# Patient Record
Sex: FEMALE | Race: WHITE | NOT HISPANIC OR LATINO
[De-identification: names, ages, dates, MRNs, and addresses within clinical notes are randomized per-mention and may not be internally consistent; named-entity substitution may affect disease eponyms.]

---

## 2017-02-22 ENCOUNTER — APPOINTMENT (OUTPATIENT)
Dept: CT IMAGING | Facility: CLINIC | Age: 77
End: 2017-02-22

## 2017-02-22 ENCOUNTER — OUTPATIENT (OUTPATIENT)
Dept: OUTPATIENT SERVICES | Facility: HOSPITAL | Age: 77
LOS: 1 days | End: 2017-02-22
Payer: MEDICARE

## 2017-02-22 DIAGNOSIS — R91.8 OTHER NONSPECIFIC ABNORMAL FINDING OF LUNG FIELD: Chronic | ICD-10-CM

## 2017-02-22 DIAGNOSIS — Z00.8 ENCOUNTER FOR OTHER GENERAL EXAMINATION: ICD-10-CM

## 2017-02-22 DIAGNOSIS — K46.9 UNSPECIFIED ABDOMINAL HERNIA WITHOUT OBSTRUCTION OR GANGRENE: Chronic | ICD-10-CM

## 2017-02-22 PROCEDURE — 71250 CT THORAX DX C-: CPT | Mod: 26

## 2017-02-22 PROCEDURE — 71250 CT THORAX DX C-: CPT

## 2017-05-11 ENCOUNTER — APPOINTMENT (OUTPATIENT)
Dept: BARIATRICS/WEIGHT MGMT | Facility: CLINIC | Age: 77
End: 2017-05-11

## 2017-05-23 ENCOUNTER — APPOINTMENT (OUTPATIENT)
Dept: BARIATRICS/WEIGHT MGMT | Facility: CLINIC | Age: 77
End: 2017-05-23

## 2017-06-14 ENCOUNTER — APPOINTMENT (OUTPATIENT)
Dept: BARIATRICS/WEIGHT MGMT | Facility: CLINIC | Age: 77
End: 2017-06-14

## 2017-11-17 ENCOUNTER — APPOINTMENT (OUTPATIENT)
Dept: CT IMAGING | Facility: CLINIC | Age: 77
End: 2017-11-17
Payer: MEDICARE

## 2017-11-17 ENCOUNTER — OUTPATIENT (OUTPATIENT)
Dept: OUTPATIENT SERVICES | Facility: HOSPITAL | Age: 77
LOS: 1 days | End: 2017-11-17
Payer: MEDICARE

## 2017-11-17 DIAGNOSIS — R91.8 OTHER NONSPECIFIC ABNORMAL FINDING OF LUNG FIELD: Chronic | ICD-10-CM

## 2017-11-17 DIAGNOSIS — K46.9 UNSPECIFIED ABDOMINAL HERNIA WITHOUT OBSTRUCTION OR GANGRENE: Chronic | ICD-10-CM

## 2017-11-17 DIAGNOSIS — Z00.8 ENCOUNTER FOR OTHER GENERAL EXAMINATION: ICD-10-CM

## 2017-11-17 PROCEDURE — 71250 CT THORAX DX C-: CPT | Mod: 26

## 2017-11-17 PROCEDURE — 71250 CT THORAX DX C-: CPT

## 2018-01-10 ENCOUNTER — INPATIENT (INPATIENT)
Facility: HOSPITAL | Age: 78
LOS: 3 days | Discharge: HOME | DRG: 871 | End: 2018-01-14
Attending: INTERNAL MEDICINE | Admitting: STUDENT IN AN ORGANIZED HEALTH CARE EDUCATION/TRAINING PROGRAM
Payer: MEDICARE

## 2018-01-10 VITALS
OXYGEN SATURATION: 96 % | SYSTOLIC BLOOD PRESSURE: 162 MMHG | HEIGHT: 63 IN | WEIGHT: 240.08 LBS | RESPIRATION RATE: 18 BRPM | HEART RATE: 112 BPM | DIASTOLIC BLOOD PRESSURE: 80 MMHG | TEMPERATURE: 99 F

## 2018-01-10 DIAGNOSIS — C85.90 NON-HODGKIN LYMPHOMA, UNSPECIFIED, UNSPECIFIED SITE: ICD-10-CM

## 2018-01-10 DIAGNOSIS — A41.9 SEPSIS, UNSPECIFIED ORGANISM: ICD-10-CM

## 2018-01-10 DIAGNOSIS — I63.9 CEREBRAL INFARCTION, UNSPECIFIED: ICD-10-CM

## 2018-01-10 DIAGNOSIS — E78.5 HYPERLIPIDEMIA, UNSPECIFIED: ICD-10-CM

## 2018-01-10 DIAGNOSIS — D86.0 SARCOIDOSIS OF LUNG: ICD-10-CM

## 2018-01-10 DIAGNOSIS — E03.9 HYPOTHYROIDISM, UNSPECIFIED: ICD-10-CM

## 2018-01-10 DIAGNOSIS — K46.9 UNSPECIFIED ABDOMINAL HERNIA WITHOUT OBSTRUCTION OR GANGRENE: Chronic | ICD-10-CM

## 2018-01-10 DIAGNOSIS — Z29.9 ENCOUNTER FOR PROPHYLACTIC MEASURES, UNSPECIFIED: ICD-10-CM

## 2018-01-10 DIAGNOSIS — R91.8 OTHER NONSPECIFIC ABNORMAL FINDING OF LUNG FIELD: Chronic | ICD-10-CM

## 2018-01-10 DIAGNOSIS — D72.829 ELEVATED WHITE BLOOD CELL COUNT, UNSPECIFIED: ICD-10-CM

## 2018-01-10 DIAGNOSIS — I10 ESSENTIAL (PRIMARY) HYPERTENSION: ICD-10-CM

## 2018-01-10 DIAGNOSIS — J18.9 PNEUMONIA, UNSPECIFIED ORGANISM: ICD-10-CM

## 2018-01-10 DIAGNOSIS — R05 COUGH: ICD-10-CM

## 2018-01-10 LAB
ALBUMIN SERPL ELPH-MCNC: 2.8 G/DL — LOW (ref 3.3–5)
ALP SERPL-CCNC: 159 U/L — HIGH (ref 40–120)
ALT FLD-CCNC: 44 U/L — SIGNIFICANT CHANGE UP (ref 12–78)
ANION GAP SERPL CALC-SCNC: 9 MMOL/L — SIGNIFICANT CHANGE UP (ref 5–17)
APTT BLD: 34.5 SEC — SIGNIFICANT CHANGE UP (ref 27.5–37.4)
AST SERPL-CCNC: 36 U/L — SIGNIFICANT CHANGE UP (ref 15–37)
BASOPHILS # BLD AUTO: 0.1 K/UL — SIGNIFICANT CHANGE UP (ref 0–0.2)
BASOPHILS NFR BLD AUTO: 0.4 % — SIGNIFICANT CHANGE UP (ref 0–2)
BILIRUB SERPL-MCNC: 0.6 MG/DL — SIGNIFICANT CHANGE UP (ref 0.2–1.2)
BUN SERPL-MCNC: 13 MG/DL — SIGNIFICANT CHANGE UP (ref 7–23)
CALCIUM SERPL-MCNC: 8.6 MG/DL — SIGNIFICANT CHANGE UP (ref 8.5–10.1)
CHLORIDE SERPL-SCNC: 105 MMOL/L — SIGNIFICANT CHANGE UP (ref 96–108)
CO2 SERPL-SCNC: 26 MMOL/L — SIGNIFICANT CHANGE UP (ref 22–31)
CREAT SERPL-MCNC: 0.69 MG/DL — SIGNIFICANT CHANGE UP (ref 0.5–1.3)
EOSINOPHIL # BLD AUTO: 0 K/UL — SIGNIFICANT CHANGE UP (ref 0–0.5)
EOSINOPHIL NFR BLD AUTO: 0.2 % — SIGNIFICANT CHANGE UP (ref 0–6)
GLUCOSE SERPL-MCNC: 107 MG/DL — HIGH (ref 70–99)
HCT VFR BLD CALC: 36 % — SIGNIFICANT CHANGE UP (ref 34.5–45)
HGB BLD-MCNC: 11.6 G/DL — SIGNIFICANT CHANGE UP (ref 11.5–15.5)
INR BLD: 1.22 RATIO — HIGH (ref 0.88–1.16)
LACTATE SERPL-SCNC: 0.6 MMOL/L — LOW (ref 0.7–2)
LYMPHOCYTES # BLD AUTO: 1.3 K/UL — SIGNIFICANT CHANGE UP (ref 1–3.3)
LYMPHOCYTES # BLD AUTO: 7.7 % — LOW (ref 13–44)
MCHC RBC-ENTMCNC: 27.3 PG — SIGNIFICANT CHANGE UP (ref 27–34)
MCHC RBC-ENTMCNC: 32.4 GM/DL — SIGNIFICANT CHANGE UP (ref 32–36)
MCV RBC AUTO: 84.2 FL — SIGNIFICANT CHANGE UP (ref 80–100)
MONOCYTES # BLD AUTO: 1.3 K/UL — HIGH (ref 0–0.9)
MONOCYTES NFR BLD AUTO: 8 % — SIGNIFICANT CHANGE UP (ref 1–9)
NEUTROPHILS # BLD AUTO: 14.1 K/UL — HIGH (ref 1.8–7.4)
NEUTROPHILS NFR BLD AUTO: 83.7 % — HIGH (ref 43–77)
PLATELET # BLD AUTO: 244 K/UL — SIGNIFICANT CHANGE UP (ref 150–400)
POTASSIUM SERPL-MCNC: 3.7 MMOL/L — SIGNIFICANT CHANGE UP (ref 3.5–5.3)
POTASSIUM SERPL-SCNC: 3.7 MMOL/L — SIGNIFICANT CHANGE UP (ref 3.5–5.3)
PROT SERPL-MCNC: 7.5 G/DL — SIGNIFICANT CHANGE UP (ref 6–8.3)
PROTHROM AB SERPL-ACNC: 13.4 SEC — HIGH (ref 9.8–12.7)
RAPID RVP RESULT: SIGNIFICANT CHANGE UP
RBC # BLD: 4.27 M/UL — SIGNIFICANT CHANGE UP (ref 3.8–5.2)
RBC # FLD: 13 % — SIGNIFICANT CHANGE UP (ref 10.3–14.5)
SODIUM SERPL-SCNC: 140 MMOL/L — SIGNIFICANT CHANGE UP (ref 135–145)
WBC # BLD: 16.9 K/UL — HIGH (ref 3.8–10.5)
WBC # FLD AUTO: 16.9 K/UL — HIGH (ref 3.8–10.5)

## 2018-01-10 PROCEDURE — 93010 ELECTROCARDIOGRAM REPORT: CPT

## 2018-01-10 PROCEDURE — 99285 EMERGENCY DEPT VISIT HI MDM: CPT

## 2018-01-10 PROCEDURE — 71250 CT THORAX DX C-: CPT | Mod: 26

## 2018-01-10 PROCEDURE — 99223 1ST HOSP IP/OBS HIGH 75: CPT | Mod: AI,GC

## 2018-01-10 PROCEDURE — 71046 X-RAY EXAM CHEST 2 VIEWS: CPT | Mod: 26

## 2018-01-10 RX ORDER — IPRATROPIUM/ALBUTEROL SULFATE 18-103MCG
3 AEROSOL WITH ADAPTER (GRAM) INHALATION EVERY 6 HOURS
Qty: 0 | Refills: 0 | Status: DISCONTINUED | OUTPATIENT
Start: 2018-01-10 | End: 2018-01-14

## 2018-01-10 RX ORDER — ENOXAPARIN SODIUM 100 MG/ML
40 INJECTION SUBCUTANEOUS
Qty: 0 | Refills: 0 | Status: DISCONTINUED | OUTPATIENT
Start: 2018-01-10 | End: 2018-01-14

## 2018-01-10 RX ORDER — AZITHROMYCIN 500 MG/1
500 TABLET, FILM COATED ORAL ONCE
Qty: 0 | Refills: 0 | Status: COMPLETED | OUTPATIENT
Start: 2018-01-10 | End: 2018-01-10

## 2018-01-10 RX ORDER — IPRATROPIUM/ALBUTEROL SULFATE 18-103MCG
3 AEROSOL WITH ADAPTER (GRAM) INHALATION ONCE
Qty: 0 | Refills: 0 | Status: COMPLETED | OUTPATIENT
Start: 2018-01-10 | End: 2018-01-10

## 2018-01-10 RX ORDER — ACETAMINOPHEN 500 MG
650 TABLET ORAL EVERY 6 HOURS
Qty: 0 | Refills: 0 | Status: DISCONTINUED | OUTPATIENT
Start: 2018-01-10 | End: 2018-01-14

## 2018-01-10 RX ORDER — BUDESONIDE AND FORMOTEROL FUMARATE DIHYDRATE 160; 4.5 UG/1; UG/1
2 AEROSOL RESPIRATORY (INHALATION)
Qty: 0 | Refills: 0 | Status: DISCONTINUED | OUTPATIENT
Start: 2018-01-10 | End: 2018-01-14

## 2018-01-10 RX ORDER — CEFTRIAXONE 500 MG/1
1 INJECTION, POWDER, FOR SOLUTION INTRAMUSCULAR; INTRAVENOUS EVERY 24 HOURS
Qty: 0 | Refills: 0 | Status: DISCONTINUED | OUTPATIENT
Start: 2018-01-11 | End: 2018-01-14

## 2018-01-10 RX ORDER — SIMVASTATIN 20 MG/1
40 TABLET, FILM COATED ORAL DAILY
Qty: 0 | Refills: 0 | Status: DISCONTINUED | OUTPATIENT
Start: 2018-01-10 | End: 2018-01-14

## 2018-01-10 RX ORDER — ASPIRIN/CALCIUM CARB/MAGNESIUM 324 MG
81 TABLET ORAL DAILY
Qty: 0 | Refills: 0 | Status: DISCONTINUED | OUTPATIENT
Start: 2018-01-10 | End: 2018-01-14

## 2018-01-10 RX ORDER — AZITHROMYCIN 500 MG/1
500 TABLET, FILM COATED ORAL EVERY 24 HOURS
Qty: 0 | Refills: 0 | Status: DISCONTINUED | OUTPATIENT
Start: 2018-01-10 | End: 2018-01-14

## 2018-01-10 RX ORDER — CEFTRIAXONE 500 MG/1
1 INJECTION, POWDER, FOR SOLUTION INTRAMUSCULAR; INTRAVENOUS ONCE
Qty: 0 | Refills: 0 | Status: COMPLETED | OUTPATIENT
Start: 2018-01-10 | End: 2018-01-10

## 2018-01-10 RX ORDER — CHOLECALCIFEROL (VITAMIN D3) 125 MCG
2000 CAPSULE ORAL DAILY
Qty: 0 | Refills: 0 | Status: DISCONTINUED | OUTPATIENT
Start: 2018-01-10 | End: 2018-01-14

## 2018-01-10 RX ORDER — LEVOTHYROXINE SODIUM 125 MCG
50 TABLET ORAL DAILY
Qty: 0 | Refills: 0 | Status: DISCONTINUED | OUTPATIENT
Start: 2018-01-10 | End: 2018-01-14

## 2018-01-10 RX ORDER — LISINOPRIL 2.5 MG/1
10 TABLET ORAL DAILY
Qty: 0 | Refills: 0 | Status: DISCONTINUED | OUTPATIENT
Start: 2018-01-10 | End: 2018-01-14

## 2018-01-10 RX ADMIN — CEFTRIAXONE 100 GRAM(S): 500 INJECTION, POWDER, FOR SOLUTION INTRAMUSCULAR; INTRAVENOUS at 13:41

## 2018-01-10 RX ADMIN — Medication 125 MILLIGRAM(S): at 12:00

## 2018-01-10 RX ADMIN — Medication 3 MILLILITER(S): at 12:00

## 2018-01-10 RX ADMIN — Medication 3 MILLILITER(S): at 11:30

## 2018-01-10 RX ADMIN — ENOXAPARIN SODIUM 40 MILLIGRAM(S): 100 INJECTION SUBCUTANEOUS at 19:40

## 2018-01-10 RX ADMIN — BUDESONIDE AND FORMOTEROL FUMARATE DIHYDRATE 2 PUFF(S): 160; 4.5 AEROSOL RESPIRATORY (INHALATION) at 22:25

## 2018-01-10 RX ADMIN — AZITHROMYCIN 255 MILLIGRAM(S): 500 TABLET, FILM COATED ORAL at 14:34

## 2018-01-10 NOTE — ED ADULT NURSE NOTE - OBJECTIVE STATEMENT
Pt received in bed alert and oriented and resting in bed with the c/o cough and SOB x 2 days. As per Md's orders IV cat placed blood specimen obtained and sent to the lab. Pt given Neb tx along with solumedrol IV. Nursing care ongoing and safety maintained.

## 2018-01-10 NOTE — H&P ADULT - PROBLEM SELECTOR PLAN 8
IMPROVE VTE Individual Risk Assessment        RISK                                                          Points  [  ] Previous VTE                                                3  [  ] Thrombophilia                                             2  [  ] Lower limb paralysis                                   2        (unable to hold up >15 seconds)    [  ] Current Cancer                                             2         (within 6 months)  [  ] Immobilization > 24 hrs                              1  [  ] ICU/CCU stay > 24 hours                             1  [ x ] Age > 60                                                         1  IMPROVE VTE Score: 1  DVT ppx: lovenox nutrition counseling

## 2018-01-10 NOTE — H&P ADULT - PROBLEM SELECTOR PLAN 1
Admit to [facility] - RLL Pna likely CAP, septic on presentation, leukocytosis, cxr reviewed, lactate not elevated, afebrile on presentation, will monitor for fevers   - c/w rocephin and zithro   - Tylenol prn for fever  - duonebs prn  - no ivf indicated, encourage PO fluids  - continuous pulse ox, keep 02 > 92%  - HOB elevated  - f/u blood cultures Admit to [facility] - RLL Pna likely CAP, septic on presentation, leukocytosis, cxr reviewed, lactate not elevated, afebrile on presentation, will monitor for fevers   - c/w rocephin and zithro, day #1  - Tylenol prn for fever  - duonebs prn  - no ivf indicated, encourage PO fluids  - continuous pulse ox, keep 02 > 92%  - HOB elevated  - f/u blood cultures

## 2018-01-10 NOTE — H&P ADULT - NSHPPHYSICALEXAM_GEN_ALL_CORE
Physical Exam:  General: Well developed, well nourished, NAD, obese  HEENT: NCAT, PERRLA, EOMI bl, moist mucous membranes   Neck: Supple, nontender, no mass  Neurology: A&Ox3, nonfocal, sensation intact, no gait abnormalities   Respiratory: decreased bs RLL field  CV: RRR, +S1/S2, no murmurs, rubs or gallops  Abdominal: Soft, NT, ND BS appreciated  Extremities: trace RLE edema, 2 + peripheral pulses  MSK: Normal ROM, no joint erythema or warmth, no joint swelling   Skin: warm, dry, normal color

## 2018-01-10 NOTE — ED PROVIDER NOTE - OBJECTIVE STATEMENT
pt c/o cough, sob, myalgias, wheezing. no fevers, chills, abd pain, d/n/v  pmd - elan  pul [place]

## 2018-01-10 NOTE — ED ADULT NURSE NOTE - PMH
Bronchitis  Hospitalized in June 2016  CVA (cerebral infarction)  2012  Dyslipidemia    Environmental allergies    Essential hypertension    Hypothyroid    Lung mass  July 2016  Morbid obesity    Non Hodgkin's lymphoma  s/p chemo, XRT 1997  Occipital neuralgia of right side    Sarcoidosis of lung    Snoring  KIKI precautions -- responds affirmatively to STOP BANG questionnaire -- admits to loud snoring; age > 50; h/o htn; bmi > 35  Stillborn, normal

## 2018-01-10 NOTE — ED ADULT NURSE NOTE - PSH
Abdominal hernia  March 2016  Carpal tunnel syndrome on both sides  s/p surgical repair  H/O lymph node biopsy  benign, right chest area  Lung mass  2013  right lung biopsy with diagnosis of sarcoidosis  S/P appendectomy    S/P hysterectomy

## 2018-01-10 NOTE — ED ADULT NURSE NOTE - ED STAT RN HANDOFF DETAILS 2
Pt stable and resting in bed. Pt denies any pain or discomfort as of this time. Pt admitted and handoff report given to SABAS Tejada for continuum of care. No sign of distress noted.

## 2018-01-10 NOTE — H&P ADULT - HISTORY OF PRESENT ILLNESS
76 F PMHx Sarcoidosis, CVA, Dyslipidemia, HTN, Hypothyroid, Non-Hodgkin's Lymphoma, Obesity presented to the ED for cough and weakness.  Pt reports symptoms started past Sunday night after she went out with family for dinner.  Reports progressive dry cough without production and increasing sob with weakness over this time.  Reports feeling warm last night with possible fever.  Symptoms progressive worsened, pt worried for pna and came to the ED.  Currently c/o cough, weakness, mild sob, loss of voice. Denies cp, dizziness, palpitation, changes in vision, abdominal pain, n/v/d/c, dysuria.    In the ED: vitals: tachy 112, afebrile, bp 162/80, o2 96% RA. Labs: wbc 16.9, alk phos 159, rvp neg. cxr: right basilar   infiltrate/atelectasis. EKG prelim read sinus tachy. Given zithro, rocephin, duonebs, solu-medrol 76 F PMHx Sarcoidosis, CVA, Dyslipidemia, HTN, Hypothyroid, Non-Hodgkin's Lymphoma, Obesity presented to the ED for cough and weakness.  Pt reports symptoms started past Sunday night after she went out with family for dinner.  Reports progressive dry cough without production and increasing sob with weakness over this time.  Reports feeling warm last night with possible fever.  Symptoms progressive worsened, pt worried for pna and came to the ED.  Associated w/ somewhat reduced appetite and feeling "off" Pt has h/o sarcoidosis and has chronic respiratory problems. She follows up w/ physicians regularly and is adherent to her medication regimen. No recent weight changes. No recent travel. No other obvious sick contacts. Currently c/o cough, weakness, mild sob, loss of voice. Denies cp, dizziness, palpitation, changes in vision, abdominal pain, n/v/d/c, dysuria.    In the ED: vitals: tachy 112, afebrile, bp 162/80, o2 96% RA. Labs: wbc 16.9, alk phos 159, rvp neg. cxr: right basilar   infiltrate/atelectasis. EKG prelim read sinus tachy. Given zithro, rocephin, duonebs, solu-medrol

## 2018-01-10 NOTE — H&P ADULT - ASSESSMENT
76 F PMHx Sarcoidosis, CVA, Dyslipidemia, HTN, Hypothyroid, Non-Hodgkin's Lymphoma, Obesity presented to the ED for cough and weakness. Admitted for PNA

## 2018-01-10 NOTE — ED ADULT NURSE REASSESSMENT NOTE - NS ED NURSE REASSESS COMMENT FT1
Received patient from Keo OBREGON. Patient alert and oriented. Vital signs as documented. Patient remains on bedside cardiac monitor. Denies pain at this time. Safety maintained. Call bell in reach.

## 2018-01-10 NOTE — H&P ADULT - NSHPSOCIALHISTORY_GEN_ALL_CORE
, lives at home alone, ambulates and performs ADLs independently. Denies etoh and drug abuse. Former smoke, 10 pack years

## 2018-01-10 NOTE — H&P ADULT - PROBLEM SELECTOR PLAN 9
IMPROVE VTE Individual Risk Assessment        RISK                                                          Points  [  ] Previous VTE                                                3  [  ] Thrombophilia                                             2  [  ] Lower limb paralysis                                   2        (unable to hold up >15 seconds)    [  ] Current Cancer                                             2         (within 6 months)  [  ] Immobilization > 24 hrs                              1  [  ] ICU/CCU stay > 24 hours                             1  [ x ] Age > 60                                                         1  IMPROVE VTE Score: 1  DVT ppx: lovenox

## 2018-01-10 NOTE — H&P ADULT - ATTENDING COMMENTS
I personally conducted a physical examination of the patient. I personally gathered the patient's history. I edited the above listed findings which were prepared by the listed resident physician. I personally discussed the plan of care with the patient. The questions and concerns were addressed to the best of my ability. The patient is in agreement with the listed treatment plan.     A/P: I reviewed and agree w/ the above documentation. I personally discussed the tx plan w/ pt and she agrees. Dr. Mclean was consulted by the ED.

## 2018-01-10 NOTE — H&P ADULT - NSHPREVIEWOFSYSTEMS_GEN_ALL_CORE
Constitutional: reports fever, chills, denies diaphoresis   HEENT: denies blurry vision, difficulty hearing  Respiratory: reports sob, lara, cough, denies sputum production, wheezing, hemoptysis  Cardiovascular: denies CP, palpitations, edema  Gastrointestinal: denies nausea, vomiting, diarrhea, constipation, abdominal pain, melena, hematochezia   Genitourinary: denies dysuria, frequency, urgency, hematuria   Skin/Breast: denies rash, itching  Musculoskeletal: denies myalgias, joint swelling, muscle weakness  Neurologic: denies headache, weakness, dizziness, paresthesias, numbness/tingling  Psychiatric: denies feeling anxious, depressed, suicidal, homicidal thoughts  Hematology/Oncology: denies bruising, tender or enlarged lymph nodes   ROS negative except as noted above

## 2018-01-10 NOTE — ED PROVIDER NOTE - CARE PLAN
Principal Discharge DX:	Cough  Secondary Diagnosis:	Wheezing Principal Discharge DX:	Cough  Secondary Diagnosis:	Wheezing  Secondary Diagnosis:	PNA (pneumonia)

## 2018-01-10 NOTE — CONSULT NOTE ADULT - SUBJECTIVE AND OBJECTIVE BOX
PULMONARY/CRITICAL CARE        Patient is a 78y old  Female who presents with a chief complaint of cough, weakness (10 Bernardino 2018 13:51), fever for few days. Some wheeze.    BRIEF HOSPITAL COURSE: ***    Events last 24 hours: ***    PAST MEDICAL & SURGICAL HISTORY:  Environmental allergies  Morbid obesity  Stillborn, normal  Snoring: KIKI precautions -- responds affirmatively to STOP BANG questionnaire -- admits to loud snoring; age &gt; 50; h/o htn; bmi &gt; 35  Bronchitis: Hospitalized in June 2016  Lung mass: July 2016  Occipital neuralgia of right side  Sarcoidosis of lung  Essential hypertension  CVA (cerebral infarction): 2012  Non Hodgkin's lymphoma: s/p chemo, XRT 1997  Hypothyroid  Dyslipidemia  Lung mass: 2013  right lung biopsy with diagnosis of sarcoidosis  Abdominal hernia: March 2016  H/O lymph node biopsy: benign, right chest area  S/P appendectomy  S/P hysterectomy  Carpal tunnel syndrome on both sides: s/p surgical repair    Allergies    No Known Allergies    Intolerances      FAMILY HISTORY:  Family history of MI (myocardial infarction) (Father)      Review of Systems:  CONSTITUTIONAL: had fever, chills, and fatigue  EYES: No eye pain, visual disturbances, or discharge  ENMT:  No difficulty hearing, tinnitus, vertigo; No sinus or throat pain  NECK: No pain or stiffness  RESPIRATORY: had cough, wheezing, chills  No hemoptysis; mild  shortness of breath  CARDIOVASCULAR: No chest pain, palpitations, dizziness, or leg swelling  GASTROINTESTINAL: No abdominal or epigastric pain. No nausea, vomiting, or hematemesis; No diarrhea or constipation. No melena or hematochezia.  GENITOURINARY: No dysuria, frequency, hematuria, or incontinence  NEUROLOGICAL: No headaches, memory loss, loss of strength, numbness, or tremors  SKIN: No itching, burning, rashes, or lesions   MUSCULOSKELETAL: No joint pain or swelling; No muscle, back, or extremity pain  PSYCHIATRIC: No depression, anxiety, mood swings, or difficulty sleeping      Medications:  azithromycin  IVPB 500 milliGRAM(s) IV Intermittent every 24 hours  cefTRIAXone   IVPB 1 Gram(s) IV Intermittent every 24 hours    lisinopril 10 milliGRAM(s) Oral daily    ALBUTerol/ipratropium for Nebulization 3 milliLiter(s) Nebulizer every 6 hours PRN  buDESOnide 160 MICROgram(s)/formoterol 4.5 MICROgram(s) Inhaler 2 Puff(s) Inhalation two times a day    acetaminophen   Tablet 650 milliGRAM(s) Oral every 6 hours PRN  acetaminophen   Tablet. 650 milliGRAM(s) Oral every 6 hours PRN      aspirin enteric coated 81 milliGRAM(s) Oral daily  enoxaparin Injectable 40 milliGRAM(s) SubCutaneous two times a day        levothyroxine 50 MICROGram(s) Oral daily  simvastatin 40 milliGRAM(s) Oral daily    cholecalciferol 2000 Unit(s) Oral daily                ICU Vital Signs Last 24 Hrs  T(C): 37.4 (10 Bernardino 2018 12:00), Max: 37.4 (10 Bernardino 2018 10:31)  T(F): 99.3 (10 Bernardino 2018 12:00), Max: 99.3 (10 Bernardino 2018 10:31)  HR: 100 (10 Bernardino 2018 12:00) (100 - 112)  BP: 158/89 (10 Bernardino 2018 12:00) (158/89 - 162/80)  BP(mean): --  ABP: --  ABP(mean): --  RR: 17 (10 Bernardino 2018 12:00) (17 - 18)  SpO2: 96% (10 Bernardino 2018 12:00) (96% - 96%)    Vital Signs Last 24 Hrs  T(C): 37.4 (10 Bernardino 2018 12:00), Max: 37.4 (10 Bernardino 2018 10:31)  T(F): 99.3 (10 Bernardino 2018 12:00), Max: 99.3 (10 Bernardino 2018 10:31)  HR: 100 (10 Bernardino 2018 12:00) (100 - 112)  BP: 158/89 (10 Bernardino 2018 12:00) (158/89 - 162/80)  BP(mean): --  RR: 17 (10 Bernardino 2018 12:00) (17 - 18)  SpO2: 96% (10 Bernardino 2018 12:00) (96% - 96%)        I&O's Detail        LABS:                        11.6   16.9  )-----------( 244      ( 10 Bernardino 2018 12:45 )             36.0     01-10    140  |  105  |  13  ----------------------------<  107<H>  3.7   |  26  |  0.69    Ca    8.6      10 Bernardino 2018 12:45    TPro  7.5  /  Alb  2.8<L>  /  TBili  0.6  /  DBili  x   /  AST  36  /  ALT  44  /  AlkPhos  159<H>  01-10          CAPILLARY BLOOD GLUCOSE        PT/INR - ( 10 Bernardino 2018 12:45 )   PT: 13.4 sec;   INR: 1.22 ratio         PTT - ( 10 Bernardino 2018 12:45 )  PTT:34.5 sec    CULTURES:      Physical Examination:    General: No acute distress.      HEENT: Pupils equal, reactive to light.  Symmetric.    PULM: crackles left base, good excursion    CVS: Regular rate and rhythm, no murmurs, rubs, or gallops    ABD: Soft, nondistended, nontender, normoactive bowel sounds, no masses    EXT: No edema, nontender    SKIN: Warm and well perfused, no rashes noted.    NEURO: Alert, oriented, interactive, nonfocal    RADIOLOGY: ***< from: CT Chest No Cont (01.10.18 @ 15:48) >  EXAM:  CT CHEST                            PROCEDURE DATE:  01/10/2018          INTERPRETATION:  CLINICAL INFORMATION:  Cough and fever chest x-ray   question bibasilar infiltrate versus atelectasis    PROCEDURE:  Using multislice helical CT, thin sections were obtained from   the thoracic inlet through the lung bases.    COMPARISON: 2/22/2017    FINDINGS:      There is no significant axillary, hilar, or mediastinal lymphadenopathy.   There is no pleural or pericardial effusion. There is atherosclerotic   calcification of the thoracic aorta and its branches. There are shotty   mediastinal lymph nodes again noted. There is coronary artery   calcification.    There is no evidence of mass or nodule. Azygos lobe is incidentally seen.   There are multiple patchy ill-defined densities bilaterally some of which   are pleural-based suspicious for inflammatory/infectious process    The osseous structures demonstrate degenerative changes.    The upper abdomen is remarkable for calcified gallstones in the   gallbladder.    IMPRESSION: Multiple patchy areas of ill-defined densities suspicious for   infectious/inflammatory process  Cholelithiasis      < end of copied text >      CRITICAL CARE TIME SPENT: ***

## 2018-01-11 DIAGNOSIS — J18.1 LOBAR PNEUMONIA, UNSPECIFIED ORGANISM: ICD-10-CM

## 2018-01-11 DIAGNOSIS — D86.9 SARCOIDOSIS, UNSPECIFIED: ICD-10-CM

## 2018-01-11 DIAGNOSIS — E66.01 MORBID (SEVERE) OBESITY DUE TO EXCESS CALORIES: ICD-10-CM

## 2018-01-11 LAB
ANION GAP SERPL CALC-SCNC: 13 MMOL/L — SIGNIFICANT CHANGE UP (ref 5–17)
BUN SERPL-MCNC: 23 MG/DL — SIGNIFICANT CHANGE UP (ref 7–23)
CALCIUM SERPL-MCNC: 9.1 MG/DL — SIGNIFICANT CHANGE UP (ref 8.5–10.1)
CHLORIDE SERPL-SCNC: 103 MMOL/L — SIGNIFICANT CHANGE UP (ref 96–108)
CO2 SERPL-SCNC: 23 MMOL/L — SIGNIFICANT CHANGE UP (ref 22–31)
CREAT SERPL-MCNC: 1 MG/DL — SIGNIFICANT CHANGE UP (ref 0.5–1.3)
GLUCOSE SERPL-MCNC: 137 MG/DL — HIGH (ref 70–99)
HCT VFR BLD CALC: 38.5 % — SIGNIFICANT CHANGE UP (ref 34.5–45)
HGB BLD-MCNC: 12.3 G/DL — SIGNIFICANT CHANGE UP (ref 11.5–15.5)
LEGIONELLA AG UR QL: NEGATIVE — SIGNIFICANT CHANGE UP
LYMPHOCYTES # BLD AUTO: 13 % — SIGNIFICANT CHANGE UP (ref 13–44)
MCHC RBC-ENTMCNC: 26.8 PG — LOW (ref 27–34)
MCHC RBC-ENTMCNC: 31.8 GM/DL — LOW (ref 32–36)
MCV RBC AUTO: 84.1 FL — SIGNIFICANT CHANGE UP (ref 80–100)
MONOCYTES NFR BLD AUTO: 8 % — SIGNIFICANT CHANGE UP (ref 1–9)
NEUTROPHILS NFR BLD AUTO: 71 % — SIGNIFICANT CHANGE UP (ref 43–77)
PLATELET # BLD AUTO: 323 K/UL — SIGNIFICANT CHANGE UP (ref 150–400)
POTASSIUM SERPL-MCNC: 3.8 MMOL/L — SIGNIFICANT CHANGE UP (ref 3.5–5.3)
POTASSIUM SERPL-SCNC: 3.8 MMOL/L — SIGNIFICANT CHANGE UP (ref 3.5–5.3)
RBC # BLD: 4.58 M/UL — SIGNIFICANT CHANGE UP (ref 3.8–5.2)
RBC # FLD: 13 % — SIGNIFICANT CHANGE UP (ref 10.3–14.5)
SODIUM SERPL-SCNC: 139 MMOL/L — SIGNIFICANT CHANGE UP (ref 135–145)
TSH SERPL-MCNC: 0.85 UIU/ML — SIGNIFICANT CHANGE UP (ref 0.36–3.74)
WBC # BLD: 19.3 K/UL — HIGH (ref 3.8–10.5)
WBC # FLD AUTO: 19.3 K/UL — HIGH (ref 3.8–10.5)

## 2018-01-11 PROCEDURE — 71046 X-RAY EXAM CHEST 2 VIEWS: CPT | Mod: 26

## 2018-01-11 PROCEDURE — 99233 SBSQ HOSP IP/OBS HIGH 50: CPT

## 2018-01-11 RX ORDER — LANOLIN ALCOHOL/MO/W.PET/CERES
5 CREAM (GRAM) TOPICAL AT BEDTIME
Qty: 0 | Refills: 0 | Status: DISCONTINUED | OUTPATIENT
Start: 2018-01-11 | End: 2018-01-13

## 2018-01-11 RX ADMIN — Medication 81 MILLIGRAM(S): at 12:58

## 2018-01-11 RX ADMIN — BUDESONIDE AND FORMOTEROL FUMARATE DIHYDRATE 2 PUFF(S): 160; 4.5 AEROSOL RESPIRATORY (INHALATION) at 21:32

## 2018-01-11 RX ADMIN — CEFTRIAXONE 100 GRAM(S): 500 INJECTION, POWDER, FOR SOLUTION INTRAMUSCULAR; INTRAVENOUS at 12:59

## 2018-01-11 RX ADMIN — ENOXAPARIN SODIUM 40 MILLIGRAM(S): 100 INJECTION SUBCUTANEOUS at 06:36

## 2018-01-11 RX ADMIN — LISINOPRIL 10 MILLIGRAM(S): 2.5 TABLET ORAL at 06:36

## 2018-01-11 RX ADMIN — SIMVASTATIN 40 MILLIGRAM(S): 20 TABLET, FILM COATED ORAL at 13:00

## 2018-01-11 RX ADMIN — AZITHROMYCIN 255 MILLIGRAM(S): 500 TABLET, FILM COATED ORAL at 14:06

## 2018-01-11 RX ADMIN — Medication 2000 UNIT(S): at 12:59

## 2018-01-11 RX ADMIN — BUDESONIDE AND FORMOTEROL FUMARATE DIHYDRATE 2 PUFF(S): 160; 4.5 AEROSOL RESPIRATORY (INHALATION) at 06:37

## 2018-01-11 RX ADMIN — Medication 20 MILLIGRAM(S): at 06:36

## 2018-01-11 RX ADMIN — ENOXAPARIN SODIUM 40 MILLIGRAM(S): 100 INJECTION SUBCUTANEOUS at 17:31

## 2018-01-11 RX ADMIN — Medication 50 MICROGRAM(S): at 06:36

## 2018-01-11 NOTE — DIETITIAN INITIAL EVALUATION ADULT. - PROBLEM SELECTOR PLAN 1
Admit to [facility] - RLL Pna likely CAP, septic on presentation, leukocytosis, cxr reviewed, lactate not elevated, afebrile on presentation, will monitor for fevers   - c/w rocephin and zithro, day #1  - Tylenol prn for fever  - duonebs prn  - no ivf indicated, encourage PO fluids  - continuous pulse ox, keep 02 > 92%  - HOB elevated  - f/u blood cultures

## 2018-01-11 NOTE — DIETITIAN INITIAL EVALUATION ADULT. - NS AS NUTRI DX OTHER
Pt states appetite/po intake is gradually improving. Food prefs/meal alternatives obtained. Dash/TLC diet verbally reviewed for continued weight loss once acute illness resolved. Pt with good understanding. Will remain available.

## 2018-01-11 NOTE — DIETITIAN INITIAL EVALUATION ADULT. - OTHER INFO
Pt A+O x 4. Dx PNA. Tolerating Dash/TLC diet well with fair appetite/po intake. Decreased po intake for past couple days pta secondary acute illness.  Usually good appetite/po intake. No report N/V. BM 1/11. Per pt follows low na, low fat diet pta. On Thania Ciro for weight loss. ~20lb intentional weight loss over past 6 months. No dietary questions/concerns.

## 2018-01-11 NOTE — PROGRESS NOTE ADULT - PROBLEM SELECTOR PLAN 1
RLL Pna on CXR likely CAP,   CT chest revealed B/L PNA  will monitor for fevers   c/w rocephin and zithro,   Tylenol prn for fever  duonebs prn  no ivf indicated, encourage PO fluids  continuous pulse ox, keep 02 > 92%  HOB elevated  f/u blood cultures

## 2018-01-12 LAB
ANION GAP SERPL CALC-SCNC: 7 MMOL/L — SIGNIFICANT CHANGE UP (ref 5–17)
BASOPHILS # BLD AUTO: 0.1 K/UL — SIGNIFICANT CHANGE UP (ref 0–0.2)
BASOPHILS NFR BLD AUTO: 0.3 % — SIGNIFICANT CHANGE UP (ref 0–2)
BUN SERPL-MCNC: 43 MG/DL — HIGH (ref 7–23)
CALCIUM SERPL-MCNC: 9.4 MG/DL — SIGNIFICANT CHANGE UP (ref 8.5–10.1)
CHLORIDE SERPL-SCNC: 104 MMOL/L — SIGNIFICANT CHANGE UP (ref 96–108)
CO2 SERPL-SCNC: 31 MMOL/L — SIGNIFICANT CHANGE UP (ref 22–31)
CREAT SERPL-MCNC: 1 MG/DL — SIGNIFICANT CHANGE UP (ref 0.5–1.3)
EOSINOPHIL # BLD AUTO: 0 K/UL — SIGNIFICANT CHANGE UP (ref 0–0.5)
EOSINOPHIL NFR BLD AUTO: 0.1 % — SIGNIFICANT CHANGE UP (ref 0–6)
GLUCOSE SERPL-MCNC: 88 MG/DL — SIGNIFICANT CHANGE UP (ref 70–99)
HCT VFR BLD CALC: 35.4 % — SIGNIFICANT CHANGE UP (ref 34.5–45)
HGB BLD-MCNC: 11.2 G/DL — LOW (ref 11.5–15.5)
LYMPHOCYTES # BLD AUTO: 15.2 % — SIGNIFICANT CHANGE UP (ref 13–44)
LYMPHOCYTES # BLD AUTO: 2.7 K/UL — SIGNIFICANT CHANGE UP (ref 1–3.3)
MCHC RBC-ENTMCNC: 26.8 PG — LOW (ref 27–34)
MCHC RBC-ENTMCNC: 31.7 GM/DL — LOW (ref 32–36)
MCV RBC AUTO: 84.5 FL — SIGNIFICANT CHANGE UP (ref 80–100)
MONOCYTES # BLD AUTO: 1.1 K/UL — HIGH (ref 0–0.9)
MONOCYTES NFR BLD AUTO: 6.2 % — SIGNIFICANT CHANGE UP (ref 1–9)
NEUTROPHILS # BLD AUTO: 13.7 K/UL — HIGH (ref 1.8–7.4)
NEUTROPHILS NFR BLD AUTO: 78.2 % — HIGH (ref 43–77)
PLATELET # BLD AUTO: 330 K/UL — SIGNIFICANT CHANGE UP (ref 150–400)
POTASSIUM SERPL-MCNC: 3.8 MMOL/L — SIGNIFICANT CHANGE UP (ref 3.5–5.3)
POTASSIUM SERPL-SCNC: 3.8 MMOL/L — SIGNIFICANT CHANGE UP (ref 3.5–5.3)
RBC # BLD: 4.19 M/UL — SIGNIFICANT CHANGE UP (ref 3.8–5.2)
RBC # FLD: 13.4 % — SIGNIFICANT CHANGE UP (ref 10.3–14.5)
SODIUM SERPL-SCNC: 142 MMOL/L — SIGNIFICANT CHANGE UP (ref 135–145)
WBC # BLD: 17.5 K/UL — HIGH (ref 3.8–10.5)
WBC # FLD AUTO: 17.5 K/UL — HIGH (ref 3.8–10.5)

## 2018-01-12 PROCEDURE — 99233 SBSQ HOSP IP/OBS HIGH 50: CPT

## 2018-01-12 RX ORDER — BENZOCAINE AND MENTHOL 5; 1 G/100ML; G/100ML
1 LIQUID ORAL EVERY 6 HOURS
Qty: 0 | Refills: 0 | Status: DISCONTINUED | OUTPATIENT
Start: 2018-01-12 | End: 2018-01-14

## 2018-01-12 RX ADMIN — ENOXAPARIN SODIUM 40 MILLIGRAM(S): 100 INJECTION SUBCUTANEOUS at 06:07

## 2018-01-12 RX ADMIN — CEFTRIAXONE 100 GRAM(S): 500 INJECTION, POWDER, FOR SOLUTION INTRAMUSCULAR; INTRAVENOUS at 13:07

## 2018-01-12 RX ADMIN — Medication 50 MICROGRAM(S): at 06:07

## 2018-01-12 RX ADMIN — Medication 81 MILLIGRAM(S): at 11:55

## 2018-01-12 RX ADMIN — LISINOPRIL 10 MILLIGRAM(S): 2.5 TABLET ORAL at 06:07

## 2018-01-12 RX ADMIN — BUDESONIDE AND FORMOTEROL FUMARATE DIHYDRATE 2 PUFF(S): 160; 4.5 AEROSOL RESPIRATORY (INHALATION) at 06:06

## 2018-01-12 RX ADMIN — SIMVASTATIN 40 MILLIGRAM(S): 20 TABLET, FILM COATED ORAL at 11:53

## 2018-01-12 RX ADMIN — BENZOCAINE AND MENTHOL 1 LOZENGE: 5; 1 LIQUID ORAL at 17:46

## 2018-01-12 RX ADMIN — Medication 20 MILLIGRAM(S): at 06:07

## 2018-01-12 RX ADMIN — ENOXAPARIN SODIUM 40 MILLIGRAM(S): 100 INJECTION SUBCUTANEOUS at 17:44

## 2018-01-12 RX ADMIN — Medication 2000 UNIT(S): at 11:53

## 2018-01-12 RX ADMIN — AZITHROMYCIN 255 MILLIGRAM(S): 500 TABLET, FILM COATED ORAL at 13:08

## 2018-01-12 RX ADMIN — Medication 5 MILLIGRAM(S): at 00:39

## 2018-01-12 RX ADMIN — Medication 5 MILLIGRAM(S): at 21:32

## 2018-01-12 NOTE — PROGRESS NOTE ADULT - PROBLEM SELECTOR PLAN 1
RLL Pna on CXR likely CAP,   CT chest revealed B/L PNA  will monitor for fevers   c/w rocephin and zithro,   Tylenol prn for fever  duonebs prn  no ivf indicated, encourage PO fluids  continuous pulse ox, keep 02 > 92%  HOB elevated  f/u blood cultures RLL Pna on CXR likely CAP,   CT chest revealed B/L PNA  will monitor for fevers   c/w rocephin and zithro,   Tylenol prn for fever  duonebs prn  no ivf indicated, encourage PO fluids  continuous pulse ox, keep 02 > 92%  HOB elevated  f/u blood cultures NTD

## 2018-01-13 ENCOUNTER — TRANSCRIPTION ENCOUNTER (OUTPATIENT)
Age: 78
End: 2018-01-13

## 2018-01-13 LAB
ALBUMIN SERPL ELPH-MCNC: 2.6 G/DL — LOW (ref 3.3–5)
ALP SERPL-CCNC: 149 U/L — HIGH (ref 40–120)
ALT FLD-CCNC: 72 U/L — SIGNIFICANT CHANGE UP (ref 12–78)
ANION GAP SERPL CALC-SCNC: 8 MMOL/L — SIGNIFICANT CHANGE UP (ref 5–17)
AST SERPL-CCNC: 44 U/L — HIGH (ref 15–37)
BILIRUB SERPL-MCNC: 0.3 MG/DL — SIGNIFICANT CHANGE UP (ref 0.2–1.2)
BUN SERPL-MCNC: 35 MG/DL — HIGH (ref 7–23)
CALCIUM SERPL-MCNC: 8.8 MG/DL — SIGNIFICANT CHANGE UP (ref 8.5–10.1)
CHLORIDE SERPL-SCNC: 99 MMOL/L — SIGNIFICANT CHANGE UP (ref 96–108)
CO2 SERPL-SCNC: 28 MMOL/L — SIGNIFICANT CHANGE UP (ref 22–31)
CREAT SERPL-MCNC: 0.81 MG/DL — SIGNIFICANT CHANGE UP (ref 0.5–1.3)
GLUCOSE SERPL-MCNC: 122 MG/DL — HIGH (ref 70–99)
HCT VFR BLD CALC: 35 % — SIGNIFICANT CHANGE UP (ref 34.5–45)
HGB BLD-MCNC: 11.3 G/DL — LOW (ref 11.5–15.5)
MCHC RBC-ENTMCNC: 27.4 PG — SIGNIFICANT CHANGE UP (ref 27–34)
MCHC RBC-ENTMCNC: 32.2 GM/DL — SIGNIFICANT CHANGE UP (ref 32–36)
MCV RBC AUTO: 85.1 FL — SIGNIFICANT CHANGE UP (ref 80–100)
PLATELET # BLD AUTO: 316 K/UL — SIGNIFICANT CHANGE UP (ref 150–400)
POTASSIUM SERPL-MCNC: 4 MMOL/L — SIGNIFICANT CHANGE UP (ref 3.5–5.3)
POTASSIUM SERPL-SCNC: 4 MMOL/L — SIGNIFICANT CHANGE UP (ref 3.5–5.3)
PROT SERPL-MCNC: 7.3 G/DL — SIGNIFICANT CHANGE UP (ref 6–8.3)
RBC # BLD: 4.11 M/UL — SIGNIFICANT CHANGE UP (ref 3.8–5.2)
RBC # FLD: 13.4 % — SIGNIFICANT CHANGE UP (ref 10.3–14.5)
SODIUM SERPL-SCNC: 135 MMOL/L — SIGNIFICANT CHANGE UP (ref 135–145)
WBC # BLD: 12.6 K/UL — HIGH (ref 3.8–10.5)
WBC # FLD AUTO: 12.6 K/UL — HIGH (ref 3.8–10.5)

## 2018-01-13 PROCEDURE — 99233 SBSQ HOSP IP/OBS HIGH 50: CPT

## 2018-01-13 PROCEDURE — 71045 X-RAY EXAM CHEST 1 VIEW: CPT | Mod: 26

## 2018-01-13 RX ORDER — LANOLIN ALCOHOL/MO/W.PET/CERES
5 CREAM (GRAM) TOPICAL AT BEDTIME
Qty: 0 | Refills: 0 | Status: DISCONTINUED | OUTPATIENT
Start: 2018-01-13 | End: 2018-01-14

## 2018-01-13 RX ADMIN — LISINOPRIL 10 MILLIGRAM(S): 2.5 TABLET ORAL at 05:29

## 2018-01-13 RX ADMIN — ENOXAPARIN SODIUM 40 MILLIGRAM(S): 100 INJECTION SUBCUTANEOUS at 17:35

## 2018-01-13 RX ADMIN — Medication 5 MILLIGRAM(S): at 22:17

## 2018-01-13 RX ADMIN — Medication 50 MICROGRAM(S): at 05:28

## 2018-01-13 RX ADMIN — Medication 81 MILLIGRAM(S): at 11:22

## 2018-01-13 RX ADMIN — AZITHROMYCIN 255 MILLIGRAM(S): 500 TABLET, FILM COATED ORAL at 13:46

## 2018-01-13 RX ADMIN — ENOXAPARIN SODIUM 40 MILLIGRAM(S): 100 INJECTION SUBCUTANEOUS at 05:28

## 2018-01-13 RX ADMIN — CEFTRIAXONE 100 GRAM(S): 500 INJECTION, POWDER, FOR SOLUTION INTRAMUSCULAR; INTRAVENOUS at 13:46

## 2018-01-13 RX ADMIN — Medication 20 MILLIGRAM(S): at 05:28

## 2018-01-13 RX ADMIN — BENZOCAINE AND MENTHOL 1 LOZENGE: 5; 1 LIQUID ORAL at 22:17

## 2018-01-13 RX ADMIN — Medication 2000 UNIT(S): at 11:21

## 2018-01-13 RX ADMIN — SIMVASTATIN 40 MILLIGRAM(S): 20 TABLET, FILM COATED ORAL at 11:21

## 2018-01-13 RX ADMIN — BENZOCAINE AND MENTHOL 1 LOZENGE: 5; 1 LIQUID ORAL at 05:33

## 2018-01-13 RX ADMIN — BUDESONIDE AND FORMOTEROL FUMARATE DIHYDRATE 2 PUFF(S): 160; 4.5 AEROSOL RESPIRATORY (INHALATION) at 07:55

## 2018-01-13 NOTE — PROGRESS NOTE ADULT - NSHPATTENDINGPLANDISCUSS_GEN_ALL_CORE
nursing in detail
nursing and Dr. Hood  in detail
nursing and Dr. blunt  in detail
RN, Patient
RN, Pul

## 2018-01-13 NOTE — DISCHARGE NOTE ADULT - MEDICATION SUMMARY - MEDICATIONS TO TAKE
I will START or STAY ON the medications listed below when I get home from the hospital:    vitamin D  --   2000 IU daily am  -- Indication: For Supplement    aspirin 81 mg oral tablet  -- (enteric coated) 81 mg by mouth once a day in the morning  -- Indication: For Home med    ramipril 2.5 mg oral capsule  -- 1 cap(s) by mouth once a day in the morning  -- Indication: For Home med    simvastatin 40 mg oral tablet  --  by mouth one tablet in the morning  -- Indication: For Home Med    doxycycline monohydrate 100 mg oral capsule  -- 1 cap(s) by mouth every 12 hours  -- Indication: For PNA (pneumonia)    Advair Diskus 250 mcg-50 mcg inhalation powder  -- 1 puff(s) inhaled 2 times a day  -- Check with your doctor before becoming pregnant.  For inhalation only.  Obtain medical advice before taking any non-prescription drugs as some may affect the action of this medication.  Rinse mouth thoroughly after use.    -- Indication: For Home Med    levothyroxine 50 mcg (0.05 mg) oral tablet  -- 1 tab(s) by mouth once a day in the morning  -- Indication: For Hypothyroidism

## 2018-01-13 NOTE — DISCHARGE NOTE ADULT - CARE PROVIDER_API CALL
Angel Mclean), Critical Care Medicine; Internal Medicine; Pulmonary Disease  [address] [place] [address]  Phone: [phone]  Fax: [phone] Angel Mclean), Critical Care Medicine; Internal Medicine; Pulmonary Disease  [address] [place] [address]  Phone: [phone]  Fax: [phone]    Julian Montelongo), Internal Medicine  [address] [place] [address]  Phone: [phone]  Fax: [phone]

## 2018-01-13 NOTE — PROGRESS NOTE ADULT - PROBLEM SELECTOR PROBLEM 7
Morbid obesity with BMI of 40.0-44.9, adult

## 2018-01-13 NOTE — DISCHARGE NOTE ADULT - PATIENT PORTAL LINK FT
“You can access the [employer] Patient Portal, offered by [facility], by registering with the following website: [email or web address]”

## 2018-01-13 NOTE — PROGRESS NOTE ADULT - PROBLEM SELECTOR PLAN 3
Previous h/o of CVA, no residual defects noted  - continue ASA
Previous h/o of CVA, no residual defects noted  - continue ASA
Stable
Stable
Previous h/o of CVA, no residual defects noted  - continue ASA

## 2018-01-13 NOTE — PROGRESS NOTE ADULT - PROBLEM SELECTOR PROBLEM 1
Pneumonia of right lower lobe due to infectious organism
Pneumonia of right lower lobe due to infectious organism
Sepsis
Pneumonia of right lower lobe due to infectious organism

## 2018-01-13 NOTE — PROGRESS NOTE ADULT - PROBLEM SELECTOR PLAN 2
Ch condition.  c/w O2 and prednisone.
Ch condition.  c/w O2 and prednisone.
F/u cxr
F/u cxr  Sputum c and s
F/u cxr as outpt
Ch condition.  c/w O2 and prednisone.

## 2018-01-13 NOTE — PROGRESS NOTE ADULT - ASSESSMENT
76 F PMHx Sarcoidosis, CVA, Dyslipidemia, HTN, Hypothyroid, Non-Hodgkin's Lymphoma, Obesity presented to the ED for cough and weakness. Admitted for PNA
76 F PMHx Sarcoidosis, CVA, Dyslipidemia, HTN, Hypothyroid, Non-Hodgkin's Lymphoma, Obesity presented to the ED for cough and weakness. Admitted for PNA
Pt well known to me with stable Sarcoidosis, admitted for pneumonia, possible early sepsis.
Pt well known to me with stable Sarcoidosis, admitted for pneumonia, possible early sepsis.
Pt well known to me with stable Sarcoidosis, admitted for pneumonia, possible early sepsis.  Overall improved
76 F PMHx Sarcoidosis, CVA, Dyslipidemia, HTN, Hypothyroid, Non-Hodgkin's Lymphoma, Obesity presented to the ED for cough and weakness. Admitted for PNA

## 2018-01-13 NOTE — DISCHARGE NOTE ADULT - SECONDARY DIAGNOSIS.
Hypothyroid Non Hodgkin's lymphoma Sarcoidosis of lung CVA (cerebral vascular accident) Dyslipidemia Essential hypertension

## 2018-01-13 NOTE — PROGRESS NOTE ADULT - PROBLEM SELECTOR PLAN 1
RLL Pna on CXR likely CAP,   CT chest revealed B/L PNA  c/w rocephin and zithro,   Tylenol prn for fever  duonebs prn  no ivf indicated, encourage PO fluids  continuous pulse ox, keep 02 > 92%  HOB elevated  f/u blood cultures NTD

## 2018-01-13 NOTE — PROGRESS NOTE ADULT - PROBLEM SELECTOR PLAN 5
Stable - continue synthroid  - f/u am tsh

## 2018-01-13 NOTE — PROGRESS NOTE ADULT - PROBLEM SELECTOR PROBLEM 5
Hypothyroid
Essential hypertension
Hypothyroid
Hypothyroid

## 2018-01-13 NOTE — DISCHARGE NOTE ADULT - CARE PLAN
Principal Discharge DX:	PNA (pneumonia)  Goal:	Resolution  Instructions for follow-up, activity and diet:	Complete course of antibiotics as prescribed, f/u with PCP.  Secondary Diagnosis:	Non Hodgkin's lymphoma  Instructions for follow-up, activity and diet:	F/u with your doctor  Secondary Diagnosis:	Sarcoidosis of lung  Instructions for follow-up, activity and diet:	F/u with your pulmonologist  Secondary Diagnosis:	CVA (cerebral vascular accident)  Instructions for follow-up, activity and diet:	Continue meds.  F/u with your doctor  Secondary Diagnosis:	Dyslipidemia  Instructions for follow-up, activity and diet:	Continue meds.  F/U with your doctor  Secondary Diagnosis:	Essential hypertension  Instructions for follow-up, activity and diet:	Continue meds.  F/U with your doctor  Secondary Diagnosis:	Hypothyroid  Instructions for follow-up, activity and diet:	Continue meds.  F/u with PCP. Principal Discharge DX:	PNA (pneumonia)  Goal:	Resolution  Assessment and plan of treatment:	Complete course of antibiotics as prescribed, f/u with PCP.  Secondary Diagnosis:	Non Hodgkin's lymphoma  Assessment and plan of treatment:	F/u with your doctor  Secondary Diagnosis:	Sarcoidosis of lung  Assessment and plan of treatment:	F/u with your pulmonologist  Secondary Diagnosis:	CVA (cerebral vascular accident)  Assessment and plan of treatment:	Continue meds.  F/u with your doctor  Secondary Diagnosis:	Dyslipidemia  Assessment and plan of treatment:	Continue meds.  F/U with your doctor  Secondary Diagnosis:	Essential hypertension  Assessment and plan of treatment:	Continue meds.  F/U with your doctor  Secondary Diagnosis:	Hypothyroid  Assessment and plan of treatment:	Continue meds.  F/u with PCP.

## 2018-01-13 NOTE — PROGRESS NOTE ADULT - PROBLEM SELECTOR PROBLEM 3
CVA (cerebral vascular accident)
CVA (cerebral vascular accident)
Sarcoidosis of lung
CVA (cerebral vascular accident)

## 2018-01-13 NOTE — DISCHARGE NOTE ADULT - HOSPITAL COURSE
76 F PMHx Sarcoidosis, CVA, Dyslipidemia, HTN, Hypothyroid, Non-Hodgkin's Lymphoma, Obesity presented to the ED for cough and weakness. Admitted for RLL, PNA 76 F PMHx Sarcoidosis, CVA, Dyslipidemia, HTN, Hypothyroid, Non-Hodgkin's Lymphoma, Obesity presented to the ED for cough and weakness. Admitted for RLL, PNA. Started on IV antibiotics, steroids, Nebs. Cultures came back negative. Was seen by Pulm Dr. Mclean. Symptoms improved. 76 F PMHx Sarcoidosis, CVA, Dyslipidemia, HTN, Hypothyroid, Non-Hodgkin's Lymphoma, Obesity presented to the ED for cough and weakness. Admitted for RLL, PNA. Started on IV antibiotics, steroids, Nebs. Cultures came back negative. Was seen by Pulm Dr. Mclean. Symptoms improved. PCP Dr. Montelongo's service was informed of the discharge.

## 2018-01-13 NOTE — PROGRESS NOTE ADULT - PROBLEM SELECTOR PROBLEM 4
Non Hodgkin's lymphoma
Essential hypertension
Essential hypertension
Non Hodgkin's lymphoma
Non Hodgkin's lymphoma
Essential hypertension

## 2018-01-13 NOTE — DISCHARGE NOTE ADULT - PLAN OF CARE
Continue meds.  F/u with PCP. Resolution Complete course of antibiotics as prescribed, f/u with PCP. F/u with your doctor F/u with your pulmonologist Continue meds.  F/u with your doctor Continue meds.  F/U with your doctor

## 2018-01-13 NOTE — PROGRESS NOTE ADULT - SUBJECTIVE AND OBJECTIVE BOX
PULMONARY/CRITICAL CARE      INTERVAL HPI/OVERNIGHT EVENTS:  Still weak, nonprod cough. No fever. borderline O2 sat  78y FemaleHPI:  76 F PMHx Sarcoidosis, CVA, Dyslipidemia, HTN, Hypothyroid, Non-Hodgkin's Lymphoma, Obesity presented to the ED for cough and weakness.  Pt reports symptoms started past Sunday night after she went out with family for dinner.  Reports progressive dry cough without production and increasing sob with weakness over this time.  Reports feeling warm last night with possible fever.  Symptoms progressive worsened, pt worried for pna and came to the ED.  Associated w/ somewhat reduced appetite and feeling "off" Pt has h/o sarcoidosis and has chronic respiratory problems. She follows up w/ physicians regularly and is adherent to her medication regimen. No recent weight changes. No recent travel. No other obvious sick contacts. Currently c/o cough, weakness, mild sob, loss of voice. Denies cp, dizziness, palpitation, changes in vision, abdominal pain, n/v/d/c, dysuria.    In the ED: vitals: tachy 112, afebrile, bp 162/80, o2 96% RA. Labs: wbc 16.9, alk phos 159, rvp neg. cxr: right basilar   infiltrate/atelectasis. EKG prelim read sinus tachy. Given zithro, rocephin, duonebs, solu-medrol (10 Bernardino 2018 13:51)        PAST MEDICAL & SURGICAL HISTORY:  Environmental allergies  Morbid obesity  Stillborn, normal  Snoring: KIKI precautions -- responds affirmatively to STOP BANG questionnaire -- admits to loud snoring; age &gt; 50; h/o htn; bmi &gt; 35  Bronchitis: Hospitalized in June 2016  Lung mass: July 2016  Occipital neuralgia of right side  Sarcoidosis of lung  Essential hypertension  CVA (cerebral infarction): 2012  Non Hodgkin's lymphoma: s/p chemo, XRT 1997  Hypothyroid  Dyslipidemia  Lung mass: 2013  right lung biopsy with diagnosis of sarcoidosis  Abdominal hernia: March 2016  H/O lymph node biopsy: benign, right chest area  S/P appendectomy  S/P hysterectomy  Carpal tunnel syndrome on both sides: s/p surgical repair        ICU Vital Signs Last 24 Hrs  T(C): 36.6 (12 Jan 2018 05:30), Max: 36.8 (11 Jan 2018 20:16)  T(F): 97.9 (12 Jan 2018 05:30), Max: 98.2 (11 Jan 2018 20:16)  HR: 72 (12 Jan 2018 05:30) (71 - 79)  BP: 107/66 (12 Jan 2018 05:30) (107/66 - 128/72)  BP(mean): --  ABP: --  ABP(mean): --  RR: 17 (12 Jan 2018 05:30) (16 - 17)  SpO2: 96% (12 Jan 2018 05:30) (91% - 96%)    Qtts:     I&O's Summary    11 Jan 2018 07:01  -  12 Jan 2018 07:00  --------------------------------------------------------  IN: 305 mL / OUT: 0 mL / NET: 305 mL            REVIEW OF SYSTEMS:    CONSTITUTIONAL: No fever, weight loss, or fatigue  EYES: No eye pain, visual disturbances, or discharge  ENMT:  No difficulty hearing, tinnitus, vertigo; No sinus or throat pain  NECK: No pain or stiffness  BREASTS: No pain, masses, or nipple discharge  RESPIRATORY: some cough, no wheezing, chills or hemoptysis; mild shortness of breath  CARDIOVASCULAR: No chest pain, palpitations, dizziness, or leg swelling  GASTROINTESTINAL: No abdominal or epigastric pain. No nausea, vomiting, or hematemesis; No diarrhea or constipation. No melena or hematochezia.  GENITOURINARY: No dysuria, frequency, hematuria, or incontinence  NEUROLOGICAL: No headaches, memory loss, loss of strength, numbness, or tremors  SKIN: No itching, burning, rashes, or lesions   LYMPH NODES: No enlarged glands  ENDOCRINE: No heat or cold intolerance; No hair loss  MUSCULOSKELETAL: No joint pain or swelling; No muscle, back, or extremity pain, no calf tenderness  PSYCHIATRIC: No depression, anxiety, mood swings, or difficulty sleeping  HEME/LYMPH: No easy bruising, or bleeding gums  ALLERGY AND IMMUNOLOGIC: No hives or eczema      PHYSICAL EXAM:    GENERAL: NAD, well-groomed, well-developed, NAD  HEAD:  Atraumatic, Normocephalic  EYES: EOMI, PERRLA, conjunctiva and sclera clear  ENMT: No tonsillar erythema, exudates, or enlargement; Moist mucous membranes, Good dentition, No lesions  NECK: Supple, No JVD, Normal thyroid  NERVOUS SYSTEM:  Alert & Oriented X3, Good concentration; Motor Strength 5/5 B/L upper and lower extremities  CHEST/LUNG: few rhonchi bibas crackles, no wheezing, or rubs  HEART: Regular rate and rhythm; No murmurs, rubs, or gallops  ABDOMEN: Soft, Nontender, Nondistended; Bowel sounds present  EXTREMITIES:  2+ Peripheral Pulses, No clubbing, cyanosis, or edema  LYMPH: No lymphadenopathy noted  SKIN: No rashes or lesions        LABS:                        12.3   19.3  )-----------( 323      ( 11 Jan 2018 09:09 )             38.5     01-11    139  |  103  |  23  ----------------------------<  137<H>  3.8   |  23  |  1.00    Ca    9.1      11 Jan 2018 09:09    TPro  7.5  /  Alb  2.8<L>  /  TBili  0.6  /  DBili  x   /  AST  36  /  ALT  44  /  AlkPhos  159<H>  01-10    PT/INR - ( 10 Bernardino 2018 12:45 )   PT: 13.4 sec;   INR: 1.22 ratio       < from: Xray Chest 2 Views PA/Lat (01.11.18 @ 08:59) >    EXAM:  XR CHEST PA LAT 2V                            PROCEDURE DATE:  01/11/2018          INTERPRETATION:  Chest 2 views    HISTORY: Chest pain and cough    Comparison: 1/10/2018    Frontal and lateral views of the chest were obtained with suboptimal   inspiration. With allowance for this, the heart is similar in size and   the lungs show partial clearing of the right lung base. There is no   evidence of pneumothorax nor pleural effusion.     IMPRESSION: Partial clearing, right base.    Thank you for this referral.                REJI BENNETT M.D., ATTENDING RADIOLOGIST  This document has been electronically signed. Jan 11 2018  9:17AM    < end of copied text >    PTT - ( 10 Bernardino 2018 12:45 )  PTT:34.5 sec      vanco through     RADIOLOGY & ADDITIONAL STUDIES:      CRITICAL CARE TIME SPENT:
PULMONARY/CRITICAL CARE      INTERVAL HPI/OVERNIGHT EVENTS: Pt improved, less sob, cough. No fever.    78y FemaleHPI:  76 F PMHx Sarcoidosis, CVA, Dyslipidemia, HTN, Hypothyroid, Non-Hodgkin's Lymphoma, Obesity presented to the ED for cough and weakness.  Pt reports symptoms started past Sunday night after she went out with family for dinner.  Reports progressive dry cough without production and increasing sob with weakness over this time.  Reports feeling warm last night with possible fever.  Symptoms progressive worsened, pt worried for pna and came to the ED.  Associated w/ somewhat reduced appetite and feeling "off" Pt has h/o sarcoidosis and has chronic respiratory problems. She follows up w/ physicians regularly and is adherent to her medication regimen. No recent weight changes. No recent travel. No other obvious sick contacts. Currently c/o cough, weakness, mild sob, loss of voice. Denies cp, dizziness, palpitation, changes in vision, abdominal pain, n/v/d/c, dysuria.    In the ED: vitals: tachy 112, afebrile, bp 162/80, o2 96% RA. Labs: wbc 16.9, alk phos 159, rvp neg. cxr: right basilar   infiltrate/atelectasis. EKG prelim read sinus tachy. Given zithro, rocephin, duonebs, solu-medrol (10 Bernardino 2018 13:51)        PAST MEDICAL & SURGICAL HISTORY:  Environmental allergies  Morbid obesity  Stillborn, normal  Snoring: KIKI precautions -- responds affirmatively to STOP BANG questionnaire -- admits to loud snoring; age &gt; 50; h/o htn; bmi &gt; 35  Bronchitis: Hospitalized in June 2016  Lung mass: July 2016  Occipital neuralgia of right side  Sarcoidosis of lung  Essential hypertension  CVA (cerebral infarction): 2012  Non Hodgkin's lymphoma: s/p chemo, XRT 1997  Hypothyroid  Dyslipidemia  Lung mass: 2013  right lung biopsy with diagnosis of sarcoidosis  Abdominal hernia: March 2016  H/O lymph node biopsy: benign, right chest area  S/P appendectomy  S/P hysterectomy  Carpal tunnel syndrome on both sides: s/p surgical repair        ICU Vital Signs Last 24 Hrs  T(C): 36.3 (11 Jan 2018 05:19), Max: 37.4 (10 Bernardino 2018 10:31)  T(F): 97.3 (11 Jan 2018 05:19), Max: 99.3 (10 Bernardino 2018 10:31)  HR: 78 (11 Jan 2018 05:19) (78 - 112)  BP: 132/77 (11 Jan 2018 05:19) (125/70 - 162/80)  BP(mean): --  ABP: --  ABP(mean): --  RR: 16 (11 Jan 2018 05:19) (15 - 18)  SpO2: 96% (11 Jan 2018 05:19) (91% - 96%)    Qtts:     I&O's Summary        Review of Systems:  CONSTITUTIONAL: had fever, chills, and fatigue  EYES: No eye pain, visual disturbances, or discharge  ENMT:  No difficulty hearing, tinnitus, vertigo; No sinus or throat pain  NECK: No pain or stiffness  RESPIRATORY: had cough, wheezing, chills  No hemoptysis; mild  shortness of breath  CARDIOVASCULAR: No chest pain, palpitations, dizziness, or leg swelling  GASTROINTESTINAL: No abdominal or epigastric pain. No nausea, vomiting, or hematemesis; No diarrhea or constipation. No melena or hematochezia.  GENITOURINARY: No dysuria, frequency, hematuria, or incontinence  NEUROLOGICAL: No headaches, memory loss, loss of strength, numbness, or tremors  SKIN: No itching, burning, rashes, or lesions   MUSCULOSKELETAL: No joint pain or swelling; No muscle, back, or extremity pain  PSYCHIATRIC: No depression, anxiety, mood swings, or difficulty sleeping      Medications:  azithromycin  IVPB 500 milliGRAM(s) IV Intermittent every 24 hours  cefTRIAXone   IVPB 1 Gram(s) IV Intermittent every 24 hours    lisinopril 10 milliGRAM(s) Oral daily    ALBUTerol/ipratropium for Nebulization 3 milliLiter(s) Nebulizer every 6 hours PRN  buDESOnide 160 MICROgram(s)/formoterol 4.5 MICROgram(s) Inhaler 2 Puff(s) Inhalation two times a day    acetaminophen   Tablet 650 milliGRAM(s) Oral every 6 hours PRN  acetaminophen   Tablet. 650 milliGRAM(s) Oral every 6 hours PRN      aspirin enteric coated 81 milliGRAM(s) Oral daily  enoxaparin Injectable 40 milliGRAM(s) SubCutaneous two times a day        levothyroxine 50 MICROGram(s) Oral daily  simvastatin 40 milliGRAM(s) Oral daily    cholecalciferol 2000 Unit(s) Oral daily          CULTURES:      Physical Examination:    General: No acute distress.      HEENT: Pupils equal, reactive to light.  Symmetric.    PULM: crackles left base, good excursion    CVS: Regular rate and rhythm, no murmurs, rubs, or gallops    ABD: Soft, nondistended, nontender, normoactive bowel sounds, no masses    EXT: No edema, nontender    SKIN: Warm and well perfused, no rashes noted.    NEURO: Alert, oriented, interactive, nonfocal        LABS:                        11.6   16.9  )-----------( 244      ( 10 Bernardino 2018 12:45 )             36.0     01-10    140  |  105  |  13  ----------------------------<  107<H>  3.7   |  26  |  0.69    Ca    8.6      10 Bernardino 2018 12:45    TPro  7.5  /  Alb  2.8<L>  /  TBili  0.6  /  DBili  x   /  AST  36  /  ALT  44  /  AlkPhos  159<H>  01-10    PT/INR - ( 10 Bernardino 2018 12:45 )   PT: 13.4 sec;   INR: 1.22 ratio         PTT - ( 10 Bernardino 2018 12:45 )  PTT:34.5 sec      vanco through     RADIOLOGY & ADDITIONAL STUDIES:      CRITICAL CARE TIME SPENT:
PULMONARY/CRITICAL CARE      INTERVAL HPI/OVERNIGHT EVENTS: Pt. improved, less sob. Some cough, nonprod.    78y FemaleHPI:  76 F PMHx Sarcoidosis, CVA, Dyslipidemia, HTN, Hypothyroid, Non-Hodgkin's Lymphoma, Obesity presented to the ED for cough and weakness.  Pt reports symptoms started past Sunday night after she went out with family for dinner.  Reports progressive dry cough without production and increasing sob with weakness over this time.  Reports feeling warm last night with possible fever.  Symptoms progressive worsened, pt worried for pna and came to the ED.  Associated w/ somewhat reduced appetite and feeling "off" Pt has h/o sarcoidosis and has chronic respiratory problems. She follows up w/ physicians regularly and is adherent to her medication regimen. No recent weight changes. No recent travel. No other obvious sick contacts. Currently c/o cough, weakness, mild sob, loss of voice. Denies cp, dizziness, palpitation, changes in vision, abdominal pain, n/v/d/c, dysuria.    In the ED: vitals: tachy 112, afebrile, bp 162/80, o2 96% RA. Labs: wbc 16.9, alk phos 159, rvp neg. cxr: right basilar   infiltrate/atelectasis. EKG prelim read sinus tachy. Given zithro, rocephin, duonebs, solu-medrol (10 Bernardino 2018 13:51)        PAST MEDICAL & SURGICAL HISTORY:  Environmental allergies  Morbid obesity  Stillborn, normal  Snoring: KIKI precautions -- responds affirmatively to STOP BANG questionnaire -- admits to loud snoring; age &gt; 50; h/o htn; bmi &gt; 35  Bronchitis: Hospitalized in June 2016  Lung mass: July 2016  Occipital neuralgia of right side  Sarcoidosis of lung  Essential hypertension  CVA (cerebral infarction): 2012  Non Hodgkin's lymphoma: s/p chemo, XRT 1997  Hypothyroid  Dyslipidemia  Lung mass: 2013  right lung biopsy with diagnosis of sarcoidosis  Abdominal hernia: March 2016  H/O lymph node biopsy: benign, right chest area  S/P appendectomy  S/P hysterectomy  Carpal tunnel syndrome on both sides: s/p surgical repair        ICU Vital Signs Last 24 Hrs  T(C): 36.6 (13 Jan 2018 04:49), Max: 36.6 (12 Jan 2018 13:21)  T(F): 97.9 (13 Jan 2018 04:49), Max: 97.9 (12 Jan 2018 13:21)  HR: 75 (13 Jan 2018 04:49) (73 - 82)  BP: 133/79 (13 Jan 2018 04:49) (113/72 - 139/80)  BP(mean): --  ABP: --  ABP(mean): --  RR: 17 (13 Jan 2018 04:49) (16 - 18)  SpO2: 93% (13 Jan 2018 06:46) (93% - 99%)    Qtts:     I&O's Summary        REVIEW OF SYSTEMS:    CONSTITUTIONAL: No fever, weight loss, or fatigue  EYES: No eye pain, visual disturbances, or discharge  ENMT:  No difficulty hearing, tinnitus, vertigo; No sinus or throat pain  NECK: No pain or stiffness  BREASTS: No pain, masses, or nipple discharge  RESPIRATORY: some cough, no wheezing, chills or hemoptysis; mild shortness of breath  CARDIOVASCULAR: No chest pain, palpitations, dizziness, or leg swelling  GASTROINTESTINAL: No abdominal or epigastric pain. No nausea, vomiting, or hematemesis; No diarrhea or constipation. No melena or hematochezia.  GENITOURINARY: No dysuria, frequency, hematuria, or incontinence  NEUROLOGICAL: No headaches, memory loss, loss of strength, numbness, or tremors  SKIN: No itching, burning, rashes, or lesions   LYMPH NODES: No enlarged glands  ENDOCRINE: No heat or cold intolerance; No hair loss  MUSCULOSKELETAL: No joint pain or swelling; No muscle, back, or extremity pain, no calf tenderness  PSYCHIATRIC: No depression, anxiety, mood swings, or difficulty sleeping  HEME/LYMPH: No easy bruising, or bleeding gums  ALLERGY AND IMMUNOLOGIC: No hives or eczema      PHYSICAL EXAM:    GENERAL: NAD, well-groomed, well-developed, NAD  HEAD:  Atraumatic, Normocephalic  EYES: EOMI, PERRLA, conjunctiva and sclera clear  ENMT: No tonsillar erythema, exudates, or enlargement; Moist mucous membranes, Good dentition, No lesions  NECK: Supple, No JVD, Normal thyroid  NERVOUS SYSTEM:  Alert & Oriented X3, Good concentration; Motor Strength 5/5 B/L upper and lower extremities  CHEST/LUNG: few rhonchi bibas crackles, no wheezing, or rubs  HEART: Regular rate and rhythm; No murmurs, rubs, or gallops  ABDOMEN: Soft, Nontender, Nondistended; Bowel sounds present  EXTREMITIES:  2+ Peripheral Pulses, No clubbing, cyanosis, or edema  LYMPH: No lymphadenopathy noted  SKIN: No rashes or lesions          LABS:                        11.3   12.6  )-----------( 316      ( 13 Jan 2018 10:08 )             35.0   < from: Xray Chest 1 View AP -PORTABLE-Routine (01.13.18 @ 09:27) >  EXAM:  XR CHEST PORTABLE  ROUTINE 1V                            PROCEDURE DATE:  01/13/2018          INTERPRETATION:  XR CHEST PORTABLE  ROUTINE 1V    Single AP view    HISTORY:  Cough    Comparison:  Chest x-ray 2 days prior    Normal heart size. Unchanged right basilar airspace disease and improved   left base airspace disease. No effusion.    IMPRESSION: Improved left and stable right lung pneumonia.                      HIPOLITO CRUZ M.D., ATTENDING RADIOLOGIST  This document has been electronically signed. Jan 13 2018 10:33AM        < end of copied text >    01-13    135  |  99  |  35<H>  ----------------------------<  122<H>  4.0   |  28  |  0.81    Ca    8.8      13 Jan 2018 10:08    TPro  7.3  /  Alb  2.6<L>  /  TBili  0.3  /  DBili  x   /  AST  44<H>  /  ALT  72  /  AlkPhos  149<H>  01-13          vanco through     RADIOLOGY & ADDITIONAL STUDIES:      CRITICAL CARE TIME SPENT:
Patient is a 78y old  Female who presents with a chief complaint of cough, weakness (10 Bernardino 2018 13:51)       INTERVAL HPI/OVERNIGHT EVENTS: Feeling better, still feels SOB, intermittently and feels very weak and tired.     MEDICATIONS  (STANDING):  aspirin enteric coated 81 milliGRAM(s) Oral daily  azithromycin  IVPB 500 milliGRAM(s) IV Intermittent every 24 hours  buDESOnide 160 MICROgram(s)/formoterol 4.5 MICROgram(s) Inhaler 2 Puff(s) Inhalation two times a day  cefTRIAXone   IVPB 1 Gram(s) IV Intermittent every 24 hours  cholecalciferol 2000 Unit(s) Oral daily  enoxaparin Injectable 40 milliGRAM(s) SubCutaneous two times a day  levothyroxine 50 MICROGram(s) Oral daily  lisinopril 10 milliGRAM(s) Oral daily  melatonin 5 milliGRAM(s) Oral at bedtime  predniSONE   Tablet 20 milliGRAM(s) Oral daily  simvastatin 40 milliGRAM(s) Oral daily    MEDICATIONS  (PRN):  acetaminophen   Tablet 650 milliGRAM(s) Oral every 6 hours PRN For Temp greater than 38 C (100.4 F)  acetaminophen   Tablet. 650 milliGRAM(s) Oral every 6 hours PRN Mild Pain (1 - 3)  ALBUTerol/ipratropium for Nebulization 3 milliLiter(s) Nebulizer every 6 hours PRN Shortness of Breath and/or Wheezing  benzocaine 15 mG/menthol 3.6 mG Lozenge 1 Lozenge Oral every 6 hours PRN Sore Throat      Allergies    No Known Allergies    Intolerances        REVIEW OF SYSTEMS:  CONSTITUTIONAL: No fever, + generalized weakness   EYES: No eye pain, visual disturbances, or discharge  ENMT:  No difficulty hearing, tinnitus, vertigo; No sinus or throat pain  NECK: No pain or stiffness  RESPIRATORY: No cough, wheezing, chills or hemoptysis; + shortness of breath  CARDIOVASCULAR: No chest pain, palpitations, dizziness, or leg swelling  GASTROINTESTINAL: No abdominal or epigastric pain. No nausea, vomiting, or hematemesis; No diarrhea or constipation. No melena or hematochezia.  GENITOURINARY: No dysuria, frequency, hematuria, or incontinence  NEUROLOGICAL: No headaches, memory loss, loss of strength, numbness, or tremors  SKIN: No itching, burning, rashes, or lesions   LYMPH NODES: No enlarged glands  ENDOCRINE: No heat or cold intolerance; No hair loss; No polydipsia or polyuria  MUSCULOSKELETAL: No joint pain or swelling; No muscle, back, or extremity pain  HEME/LYMPH: No easy bruising, or bleeding gums  ALLERGY AND IMMUNOLOGIC: No hives or eczema    Vital Signs Last 24 Hrs  T(C): 36.6 (13 Jan 2018 04:49), Max: 36.6 (12 Jan 2018 13:21)  T(F): 97.9 (13 Jan 2018 04:49), Max: 97.9 (12 Jan 2018 13:21)  HR: 75 (13 Jan 2018 04:49) (73 - 82)  BP: 133/79 (13 Jan 2018 04:49) (113/72 - 139/80)  BP(mean): --  RR: 17 (13 Jan 2018 04:49) (16 - 18)  SpO2: 93% (13 Jan 2018 06:46) (93% - 99%)    PHYSICAL EXAM:  GENERAL: NAD, well-groomed, well-developed  HEAD:  Atraumatic, Normocephalic  EYES: EOMI, PERRLA, conjunctiva and sclera clear  ENMT: No tonsillar erythema, exudates, or enlargement; Moist mucous membranes.   NECK: Supple, No JVD, Normal thyroid  NERVOUS SYSTEM:  Alert & Oriented X3, Good concentration; Non focal Neuro exam   CHEST/LUNG: Decreased breath sounds  to auscultation bilaterally; + Crackles, mild wheezing   HEART: Regular rate and rhythm; No murmurs, rubs, or gallops  ABDOMEN: Soft, Nontender, Nondistended; Bowel sounds present  EXTREMITIES:  2+ Peripheral Pulses, No clubbing, cyanosis, or edema  LYMPH: No lymphadenopathy noted  SKIN: No rashes or lesions    LABS:                        11.2   17.5  )-----------( 330      ( 12 Jan 2018 08:39 )             35.4     12 Jan 2018 08:39    142    |  104    |  43     ----------------------------<  88     3.8     |  31     |  1.00     Ca    9.4        12 Jan 2018 08:39        CAPILLARY BLOOD GLUCOSE        BLOOD CULTURE  01-10 @ 19:51   No growth to date.  --  --    RADIOLOGY & ADDITIONAL TESTS:    Imaging Personally Reviewed:  [ ] YES     Consultant(s) Notes Reviewed:      Care Discussed with Consultants/Other Providers:
Patient is a 78y old  Female who presents with a chief complaint of cough, weakness (10 Bernardino 2018 13:51)    Chart reviewed and consult noted.   INTERVAL HPI: Pt seen and examined bedside ,c/o cough, SOB and weakness. Pt feels better though  OVERNIGHT EVENTS:  T(F): 97.4 (01-11-18 @ 13:17), Max: 98.5 (01-10-18 @ 21:49)  HR: 79 (01-11-18 @ 13:17) (78 - 92)  BP: 121/71 (01-11-18 @ 13:17) (121/71 - 143/64)  RR: 16 (01-11-18 @ 13:17) (15 - 16)  SpO2: 91% (01-11-18 @ 13:17) (91% - 96%)  Wt(kg): --  I&O's Summary    11 Jan 2018 07:01  -  11 Jan 2018 13:49  --------------------------------------------------------  IN: 50 mL / OUT: 0 mL / NET: 50 mL        REVIEW OF SYSTEM:    Constitutional: No fever, chills, fatigue  Neuro: No headache, numbness, +weakness  Resp: +cough, no wheezing, +shortness of breath  CVS: No chest pain, palpitations, leg swelling  GI: No abdominal pain, nausea, vomiting, diarrhea   : No dysuria, frequency, incontinence  Skin: No itching, burning, rashes, or lesions   Msk: No joint pain or swelling  Psych: No depression, anxiety, mood swings          PHYSICAL EXAM:  GENERAL: NAD, well-developed, with O2 NC  HEAD:  Atraumatic, Normocephalic  EYES: EOMI, PERRLA, conjunctiva and sclera clear  ENMT: No tonsillar erythema, exudates, or enlargement; Moist mucous membranes,   NECK: Supple, No JVD, Normal thyroid  HEART: Regular rate and rhythm; No murmurs, rubs, or gallops  RESPIRATORY: diminished BS B/L, No wheezing / rhonchi  ABDOMEN: Soft, Nontender, Nondistended; Bowel sounds present  NEUROLOGY: A&Ox3, nonfocal, moving all extremities.  EXTREMITIES:  2+ Peripheral Pulses, No clubbing, cyanosis, or edema  SKIN: warm, dry, normal color, no rash or abnormal lesions        LABS:                        12.3   19.3  )-----------( 323      ( 11 Jan 2018 09:09 )             38.5     01-11    139  |  103  |  23  ----------------------------<  137<H>  3.8   |  23  |  1.00    Ca    9.1      11 Jan 2018 09:09    TPro  7.5  /  Alb  2.8<L>  /  TBili  0.6  /  DBili  x   /  AST  36  /  ALT  44  /  AlkPhos  159<H>  01-10    PT/INR - ( 10 Bernardino 2018 12:45 )   PT: 13.4 sec;   INR: 1.22 ratio         PTT - ( 10 Bernardino 2018 12:45 )  PTT:34.5 sec    CAPILLARY BLOOD GLUCOSE                  MEDICATIONS  (STANDING):  aspirin enteric coated 81 milliGRAM(s) Oral daily  azithromycin  IVPB 500 milliGRAM(s) IV Intermittent every 24 hours  buDESOnide 160 MICROgram(s)/formoterol 4.5 MICROgram(s) Inhaler 2 Puff(s) Inhalation two times a day  cefTRIAXone   IVPB 1 Gram(s) IV Intermittent every 24 hours  cholecalciferol 2000 Unit(s) Oral daily  enoxaparin Injectable 40 milliGRAM(s) SubCutaneous two times a day  levothyroxine 50 MICROGram(s) Oral daily  lisinopril 10 milliGRAM(s) Oral daily  predniSONE   Tablet 20 milliGRAM(s) Oral daily  simvastatin 40 milliGRAM(s) Oral daily    MEDICATIONS  (PRN):  acetaminophen   Tablet 650 milliGRAM(s) Oral every 6 hours PRN For Temp greater than 38 C (100.4 F)  acetaminophen   Tablet. 650 milliGRAM(s) Oral every 6 hours PRN Mild Pain (1 - 3)  ALBUTerol/ipratropium for Nebulization 3 milliLiter(s) Nebulizer every 6 hours PRN Shortness of Breath and/or Wheezing
Patient is a 78y old  Female who presents with a chief complaint of cough, weakness (10 Bernardino 2018 13:51)    Chart reviewed and consult noted.   INTERVAL HPI: Pt seen and examined bedside ,c/o cough, SOB and weakness. Pt feels better though  OVERNIGHT EVENTS:  Vital Signs Last 24 Hrs  T(C): 36.6 (12 Jan 2018 13:21), Max: 36.8 (11 Jan 2018 20:16)  T(F): 97.9 (12 Jan 2018 13:21), Max: 98.2 (11 Jan 2018 20:16)  HR: 73 (12 Jan 2018 13:21) (71 - 73)  BP: 113/72 (12 Jan 2018 13:21) (107/66 - 128/72)  BP(mean): --  RR: 16 (12 Jan 2018 13:21) (16 - 17)  SpO2: 93% (12 Jan 2018 13:21) (93% - 96%)      REVIEW OF SYSTEM:    Constitutional: No fever, chills, fatigue  Neuro: No headache, numbness, +weakness  Resp: +cough, no wheezing, +shortness of breath  CVS: No chest pain, palpitations, leg swelling  GI: No abdominal pain, nausea, vomiting, diarrhea   : No dysuria, frequency, incontinence  Skin: No itching, burning, rashes, or lesions   Msk: No joint pain or swelling  Psych: No depression, anxiety, mood swings          PHYSICAL EXAM:  GENERAL: NAD, well-developed, with O2 NC  HEAD:  Atraumatic, Normocephalic  EYES: EOMI, PERRLA, conjunctiva and sclera clear  ENMT: No tonsillar erythema, exudates, or enlargement; Moist mucous membranes,   NECK: Supple, No JVD, Normal thyroid  HEART: Regular rate and rhythm; No murmurs, rubs, or gallops  RESPIRATORY: diminished BS B/L, No wheezing / rhonchi  ABDOMEN: Soft, Nontender, Nondistended; Bowel sounds present  NEUROLOGY: A&Ox3, nonfocal, moving all extremities.  EXTREMITIES:  2+ Peripheral Pulses, No clubbing, cyanosis, or edema  SKIN: warm, dry, normal color, no rash or abnormal lesions        LABS:                        11.2   17.5  )-----------( 330      ( 12 Jan 2018 08:39 )             35.4     12 Jan 2018 08:39    142    |  104    |  43     ----------------------------<  88     3.8     |  31     |  1.00     Ca    9.4        12 Jan 2018 08:39          CAPILLARY BLOOD GLUCOSE        UCx       RADIOLOGY & ADDITIONAL TESTS:            MEDICATIONS  (STANDING):  aspirin enteric coated 81 milliGRAM(s) Oral daily  azithromycin  IVPB 500 milliGRAM(s) IV Intermittent every 24 hours  buDESOnide 160 MICROgram(s)/formoterol 4.5 MICROgram(s) Inhaler 2 Puff(s) Inhalation two times a day  cefTRIAXone   IVPB 1 Gram(s) IV Intermittent every 24 hours  cholecalciferol 2000 Unit(s) Oral daily  enoxaparin Injectable 40 milliGRAM(s) SubCutaneous two times a day  levothyroxine 50 MICROGram(s) Oral daily  lisinopril 10 milliGRAM(s) Oral daily  predniSONE   Tablet 20 milliGRAM(s) Oral daily  simvastatin 40 milliGRAM(s) Oral daily    MEDICATIONS  (PRN):  acetaminophen   Tablet 650 milliGRAM(s) Oral every 6 hours PRN For Temp greater than 38 C (100.4 F)  acetaminophen   Tablet. 650 milliGRAM(s) Oral every 6 hours PRN Mild Pain (1 - 3)  ALBUTerol/ipratropium for Nebulization 3 milliLiter(s) Nebulizer every 6 hours PRN Shortness of Breath and/or Wheezing

## 2018-01-13 NOTE — PROGRESS NOTE ADULT - PROBLEM SELECTOR PLAN 4
Controlled - continue ramipril with hold parameters  - monitor routine hemodynamics

## 2018-01-14 VITALS
SYSTOLIC BLOOD PRESSURE: 122 MMHG | OXYGEN SATURATION: 92 % | HEART RATE: 75 BPM | RESPIRATION RATE: 18 BRPM | DIASTOLIC BLOOD PRESSURE: 76 MMHG | TEMPERATURE: 98 F

## 2018-01-14 LAB
ANION GAP SERPL CALC-SCNC: 6 MMOL/L — SIGNIFICANT CHANGE UP (ref 5–17)
BUN SERPL-MCNC: 26 MG/DL — HIGH (ref 7–23)
CALCIUM SERPL-MCNC: 8.8 MG/DL — SIGNIFICANT CHANGE UP (ref 8.5–10.1)
CHLORIDE SERPL-SCNC: 105 MMOL/L — SIGNIFICANT CHANGE UP (ref 96–108)
CO2 SERPL-SCNC: 29 MMOL/L — SIGNIFICANT CHANGE UP (ref 22–31)
CREAT SERPL-MCNC: 0.76 MG/DL — SIGNIFICANT CHANGE UP (ref 0.5–1.3)
GLUCOSE SERPL-MCNC: 127 MG/DL — HIGH (ref 70–99)
HCT VFR BLD CALC: 36.2 % — SIGNIFICANT CHANGE UP (ref 34.5–45)
HGB BLD-MCNC: 11.4 G/DL — LOW (ref 11.5–15.5)
MCHC RBC-ENTMCNC: 26.8 PG — LOW (ref 27–34)
MCHC RBC-ENTMCNC: 31.5 GM/DL — LOW (ref 32–36)
MCV RBC AUTO: 85.2 FL — SIGNIFICANT CHANGE UP (ref 80–100)
PLATELET # BLD AUTO: 324 K/UL — SIGNIFICANT CHANGE UP (ref 150–400)
POTASSIUM SERPL-MCNC: 4.2 MMOL/L — SIGNIFICANT CHANGE UP (ref 3.5–5.3)
POTASSIUM SERPL-SCNC: 4.2 MMOL/L — SIGNIFICANT CHANGE UP (ref 3.5–5.3)
RBC # BLD: 4.24 M/UL — SIGNIFICANT CHANGE UP (ref 3.8–5.2)
RBC # FLD: 13.1 % — SIGNIFICANT CHANGE UP (ref 10.3–14.5)
SODIUM SERPL-SCNC: 140 MMOL/L — SIGNIFICANT CHANGE UP (ref 135–145)
WBC # BLD: 12 K/UL — HIGH (ref 3.8–10.5)
WBC # FLD AUTO: 12 K/UL — HIGH (ref 3.8–10.5)

## 2018-01-14 PROCEDURE — 71045 X-RAY EXAM CHEST 1 VIEW: CPT

## 2018-01-14 PROCEDURE — 96365 THER/PROPH/DIAG IV INF INIT: CPT

## 2018-01-14 PROCEDURE — 85027 COMPLETE CBC AUTOMATED: CPT

## 2018-01-14 PROCEDURE — 87633 RESP VIRUS 12-25 TARGETS: CPT

## 2018-01-14 PROCEDURE — 84443 ASSAY THYROID STIM HORMONE: CPT

## 2018-01-14 PROCEDURE — 87040 BLOOD CULTURE FOR BACTERIA: CPT

## 2018-01-14 PROCEDURE — 93005 ELECTROCARDIOGRAM TRACING: CPT

## 2018-01-14 PROCEDURE — 96367 TX/PROPH/DG ADDL SEQ IV INF: CPT

## 2018-01-14 PROCEDURE — 71250 CT THORAX DX C-: CPT

## 2018-01-14 PROCEDURE — 87798 DETECT AGENT NOS DNA AMP: CPT

## 2018-01-14 PROCEDURE — 80048 BASIC METABOLIC PNL TOTAL CA: CPT

## 2018-01-14 PROCEDURE — 99239 HOSP IP/OBS DSCHRG MGMT >30: CPT

## 2018-01-14 PROCEDURE — 97161 PT EVAL LOW COMPLEX 20 MIN: CPT

## 2018-01-14 PROCEDURE — 85730 THROMBOPLASTIN TIME PARTIAL: CPT

## 2018-01-14 PROCEDURE — 83605 ASSAY OF LACTIC ACID: CPT

## 2018-01-14 PROCEDURE — 99285 EMERGENCY DEPT VISIT HI MDM: CPT | Mod: 25

## 2018-01-14 PROCEDURE — 87581 M.PNEUMON DNA AMP PROBE: CPT

## 2018-01-14 PROCEDURE — 96375 TX/PRO/DX INJ NEW DRUG ADDON: CPT

## 2018-01-14 PROCEDURE — 85610 PROTHROMBIN TIME: CPT

## 2018-01-14 PROCEDURE — 80053 COMPREHEN METABOLIC PANEL: CPT

## 2018-01-14 PROCEDURE — 94640 AIRWAY INHALATION TREATMENT: CPT

## 2018-01-14 PROCEDURE — 87449 NOS EACH ORGANISM AG IA: CPT

## 2018-01-14 PROCEDURE — 87899 AGENT NOS ASSAY W/OPTIC: CPT

## 2018-01-14 PROCEDURE — 71046 X-RAY EXAM CHEST 2 VIEWS: CPT

## 2018-01-14 PROCEDURE — 87486 CHLMYD PNEUM DNA AMP PROBE: CPT

## 2018-01-14 RX ADMIN — BUDESONIDE AND FORMOTEROL FUMARATE DIHYDRATE 2 PUFF(S): 160; 4.5 AEROSOL RESPIRATORY (INHALATION) at 06:15

## 2018-01-14 RX ADMIN — Medication 20 MILLIGRAM(S): at 06:15

## 2018-01-14 RX ADMIN — Medication 50 MICROGRAM(S): at 06:15

## 2018-01-14 RX ADMIN — ENOXAPARIN SODIUM 40 MILLIGRAM(S): 100 INJECTION SUBCUTANEOUS at 06:14

## 2018-01-14 RX ADMIN — LISINOPRIL 10 MILLIGRAM(S): 2.5 TABLET ORAL at 06:15

## 2018-01-15 LAB
CULTURE RESULTS: SIGNIFICANT CHANGE UP
CULTURE RESULTS: SIGNIFICANT CHANGE UP
S PNEUM AG UR QL: NEGATIVE — SIGNIFICANT CHANGE UP
SPECIMEN SOURCE: SIGNIFICANT CHANGE UP
SPECIMEN SOURCE: SIGNIFICANT CHANGE UP

## 2018-12-24 NOTE — PATIENT PROFILE ADULT. - [facility]
I called and left message for patient to call back to schedule February follow up appt with Dr Ryan Diaz in our [facility]  (3) slightly limited

## 2020-06-03 NOTE — H&P ADULT - PROBLEM SELECTOR PLAN 5
Due to COVID-19 ACTION PLAN, the patient's office visit was converted to a videovisit.    This patient verbally consents to a video visit.    Patient states they are Rebel López and that they are speaking to me from their home.     It has been 0 months since the patient's last in-office visit.    Patient notes the following changes in medical history since last visit:   Chief Complaint   Patient presents with   • Other     Trouble having a BM, using a stool softener. Feels \"knot inside\" rectum, worse last 3 months. No bleeding.   • Abdominal Pain   • Gas   • Constipation      PMH: rosacea  PSH: c section  All: codeine, sulfa/sulfacetamide  SH: never smoked regularly, drinks alcohol on weekends and once during week: wine; never was a daily drinker, no drugs  House wife  FH: Dad: diagnosed with colon cancer last year, metastatic: at age 60, living  No known IBD  MGF: renal ca, liver ca   All ROS discussed and all others negative except listed in HPI  No rash, no chest pain, no shortness of breath    Patient offers the following concerns: I have taken care of her mother in law and was recommended by her per patient    Says change in bowels over the past 1-1.5 years: prior was having bms daily with coffee;since then drastically different; has to strain   More constipated: can be once every week or every other week; more recently with more pain: says has an external hemorrhoid which is not painful but thinks has an internal hemorrhoids which feels \"bruised and almost like knot\" causing pressure/pain  Says change in stool caliber with ribbon like stool  Once a week will take stool softener to help go, doesn't take anything daily or regularly   Rectal discomfort: causing pain up to abdomen: generalized abut more on right side and up into ribs with nausea; resolves if has BM; says this started in December  Says constipation gets to the point where she has indigestion from bloating  Says this is maybe once a month when  really unable to have BM  Takes probiotic daily  Takes stool softener once a week: salt water   No rectal bleeding or itching  Worried about knot inside rectum, says can feel it is bruised: worse over the past few weeks  Says eats well rounded diet  No vomiting, no diarrhea. No wt loss; says has gained weight    Says if swallows pills feels like it gets stuck in throat; no problems with eating or drinking  Says does have reflux if eats spicy or cilantro: feels tightness in chest and bowels: takes tums prn may be once a week  Given omeprazole in past for ?chest tightness and abdominal pain: says this was in ?2018: took for two weeks but didn't do much so stopped taking.  Never has had egd or colonoscopy.    Patient is doing home BP checks: no    The following testing was reviewed during this phone visit: July 17, 2019: luis neg, cbc wnl, CMP wnl, crp wnl, TSH wnl  h pylori ab agg: neg 2018    Medication and allergy reconciliation completed over the phone.     The following problems were addressed during today's call:  Problem List Items Addressed This Visit        Digestive    Constipation    Relevant Orders    CBC WITH DIFFERENTIAL    COMPREHENSIVE METABOLIC PANEL    THYROID STIMULATING HORMONE REFLEX    SERVICE TO GASTROENTEROLOGY      Other Visit Diagnoses     Bowel habit changes    -  Primary    Relevant Orders    CBC WITH DIFFERENTIAL    COMPREHENSIVE METABOLIC PANEL    THYROID STIMULATING HORMONE REFLEX    SERVICE TO GASTROENTEROLOGY    History of hemorrhoids        Relevant Orders    CBC WITH DIFFERENTIAL    COMPREHENSIVE METABOLIC PANEL    THYROID STIMULATING HORMONE REFLEX    SERVICE TO GASTROENTEROLOGY    FHx: colon cancer        Relevant Orders    CBC WITH DIFFERENTIAL    COMPREHENSIVE METABOLIC PANEL    THYROID STIMULATING HORMONE REFLEX    SERVICE TO GASTROENTEROLOGY    Rectal discomfort        Relevant Orders    CBC WITH DIFFERENTIAL    COMPREHENSIVE METABOLIC PANEL    THYROID STIMULATING HORMONE REFLEX     SERVICE TO GASTROENTEROLOGY    Acid indigestion        Relevant Orders    CBC WITH DIFFERENTIAL    COMPREHENSIVE METABOLIC PANEL    THYROID STIMULATING HORMONE REFLEX    SERVICE TO GASTROENTEROLOGY    Generalized abdominal pain        Relevant Orders    CBC WITH DIFFERENTIAL    COMPREHENSIVE METABOLIC PANEL    THYROID STIMULATING HORMONE REFLEX    SERVICE TO GASTROENTEROLOGY          The following was ordered during today's call:   Orders Placed This Encounter   • CBC with Automated Differential   • Comprehensive Metabolic Panel   • Thyroid Stimulating Hormone Reflex   • SERVICE TO GASTROENTEROLOGY   • Na Sulfate-K Sulfate-Mg Sulf (SUPREP BOWEL PREP KIT) 17.5-3.13-1.6 GM/177ML Solution   • bisacodyl (DULCOLAX) 5 MG EC tablet   pt with drastic change in bowel habits with worsening constipation, h/o hemorrhoids, fh colon cancer: father recently diagnosed with metastatic disease at age 60  Rectal discomfort with ?knot in rectum she feels and is unsure if hemorrhoids  Also with abdominal pain and acid indigestion    Given all of the above rec: labs as ordered  R/o anemia, thyroid or electrolyte issue  Would recommend egd and colonoscopy to evaluate and exclude malignancy, hemorrhoids, polyps, colitis, other path  The risks of the egd and colonoscopy (including the potentially life threatening risks of bleeding, perforation, missed lesions and sedation), benefits and alternatives were discussed with the patient in detail. The patient expressed understanding and agrees to proceed.  She will need a 3 day prep with additional mag citrate    In meantime, can use mag citrate today until has good bm then would start fiber supplement with miralax daily    Time spent talking with patient during today's call: 35 minutes    Testing should be completed prior to next visit. New prescriptions / refills sent to the pharmacy.    Patient was advised to call if they experience any new or worsening symptoms.    Fu for egd and  colonoscopy when able to be scheduled; plan for late June, early july  Due to current restrictions in place on non-emergent endoscopic procedures during pandemic conditions of COVID-19, patient will be deferred for outpatient endoscopy as soon as possible. This is in accordance with [facility] guidelines for endoscopy during COVID19 pandemic  Pt will need covid testing 2-3 days prior to procedures followed by self quarantine and is aware that procedures will be cancelled/delayed if covid positive.   Controlled - continue ramipril with hold parameters  - monitor routine hemodynamics

## 2023-10-02 NOTE — ED ADULT TRIAGE NOTE - TEMPERATURE IN CELSIUS (DEGREES C)
Patients sister Trinity Dutta called, [facility]. She asked if he could be scheduled for a virtual visit for this appointment as he was put on a ventalator and cant walk yet. At this moment they just got him a medical coordinator and working on getting transportation for him. Please advise if this is approved.     Trinity Dutta (Sister)   #[phone] 37.4